# Patient Record
Sex: FEMALE | Race: BLACK OR AFRICAN AMERICAN | NOT HISPANIC OR LATINO | URBAN - METROPOLITAN AREA
[De-identification: names, ages, dates, MRNs, and addresses within clinical notes are randomized per-mention and may not be internally consistent; named-entity substitution may affect disease eponyms.]

---

## 2017-09-26 ENCOUNTER — INPATIENT (INPATIENT)
Facility: HOSPITAL | Age: 77
LOS: 2 days | Discharge: HOME | End: 2017-09-29
Attending: HOSPITALIST | Admitting: HOSPITALIST
Payer: MEDICARE

## 2017-09-26 VITALS
TEMPERATURE: 99 F | DIASTOLIC BLOOD PRESSURE: 89 MMHG | RESPIRATION RATE: 20 BRPM | HEART RATE: 120 BPM | SYSTOLIC BLOOD PRESSURE: 149 MMHG | OXYGEN SATURATION: 100 %

## 2017-09-26 DIAGNOSIS — I10 ESSENTIAL (PRIMARY) HYPERTENSION: ICD-10-CM

## 2017-09-26 DIAGNOSIS — R73.9 HYPERGLYCEMIA, UNSPECIFIED: ICD-10-CM

## 2017-09-26 DIAGNOSIS — Z29.9 ENCOUNTER FOR PROPHYLACTIC MEASURES, UNSPECIFIED: ICD-10-CM

## 2017-09-26 DIAGNOSIS — N17.9 ACUTE KIDNEY FAILURE, UNSPECIFIED: ICD-10-CM

## 2017-09-26 DIAGNOSIS — K59.00 CONSTIPATION, UNSPECIFIED: ICD-10-CM

## 2017-09-26 DIAGNOSIS — N39.0 URINARY TRACT INFECTION, SITE NOT SPECIFIED: ICD-10-CM

## 2017-09-26 DIAGNOSIS — E87.0 HYPEROSMOLALITY AND HYPERNATREMIA: ICD-10-CM

## 2017-09-26 DIAGNOSIS — E86.0 DEHYDRATION: ICD-10-CM

## 2017-09-26 LAB
ALBUMIN SERPL ELPH-MCNC: 3.5 G/DL — SIGNIFICANT CHANGE UP (ref 3.3–5)
ALBUMIN SERPL ELPH-MCNC: 4.4 G/DL — SIGNIFICANT CHANGE UP (ref 3.3–5)
ALP SERPL-CCNC: 114 U/L — SIGNIFICANT CHANGE UP (ref 40–120)
ALP SERPL-CCNC: 82 U/L — SIGNIFICANT CHANGE UP (ref 40–120)
ALT FLD-CCNC: 14 U/L — SIGNIFICANT CHANGE UP (ref 4–33)
ALT FLD-CCNC: 18 U/L — SIGNIFICANT CHANGE UP (ref 4–33)
APPEARANCE UR: CLEAR — SIGNIFICANT CHANGE UP
APTT BLD: 22.8 SEC — LOW (ref 27.5–37.4)
AST SERPL-CCNC: 13 U/L — SIGNIFICANT CHANGE UP (ref 4–32)
AST SERPL-CCNC: 14 U/L — SIGNIFICANT CHANGE UP (ref 4–32)
B-OH-BUTYR SERPL-SCNC: 6.8 MMOL/L — HIGH (ref 0–0.4)
BACTERIA # UR AUTO: SIGNIFICANT CHANGE UP
BASE EXCESS BLDV CALC-SCNC: -2.8 MMOL/L — SIGNIFICANT CHANGE UP
BASE EXCESS BLDV CALC-SCNC: -3.5 MMOL/L — SIGNIFICANT CHANGE UP
BASE EXCESS BLDV CALC-SCNC: 2.5 MMOL/L — SIGNIFICANT CHANGE UP
BASOPHILS # BLD AUTO: 0.01 K/UL — SIGNIFICANT CHANGE UP (ref 0–0.2)
BASOPHILS # BLD AUTO: 0.01 K/UL — SIGNIFICANT CHANGE UP (ref 0–0.2)
BASOPHILS # BLD AUTO: 0.03 K/UL — SIGNIFICANT CHANGE UP (ref 0–0.2)
BASOPHILS NFR BLD AUTO: 0.1 % — SIGNIFICANT CHANGE UP (ref 0–2)
BASOPHILS NFR BLD AUTO: 0.2 % — SIGNIFICANT CHANGE UP (ref 0–2)
BASOPHILS NFR BLD AUTO: 0.4 % — SIGNIFICANT CHANGE UP (ref 0–2)
BILIRUB SERPL-MCNC: 0.2 MG/DL — SIGNIFICANT CHANGE UP (ref 0.2–1.2)
BILIRUB SERPL-MCNC: 0.5 MG/DL — SIGNIFICANT CHANGE UP (ref 0.2–1.2)
BILIRUB UR-MCNC: NEGATIVE — SIGNIFICANT CHANGE UP
BLOOD GAS VENOUS - CREATININE: 1.19 MG/DL — SIGNIFICANT CHANGE UP (ref 0.5–1.3)
BLOOD GAS VENOUS - CREATININE: 1.37 MG/DL — HIGH (ref 0.5–1.3)
BLOOD GAS VENOUS - CREATININE: 1.64 MG/DL — HIGH (ref 0.5–1.3)
BLOOD UR QL VISUAL: NEGATIVE — SIGNIFICANT CHANGE UP
BUN SERPL-MCNC: 19 MG/DL — SIGNIFICANT CHANGE UP (ref 7–23)
BUN SERPL-MCNC: 22 MG/DL — SIGNIFICANT CHANGE UP (ref 7–23)
BUN SERPL-MCNC: 22 MG/DL — SIGNIFICANT CHANGE UP (ref 7–23)
BUN SERPL-MCNC: 27 MG/DL — HIGH (ref 7–23)
CALCIUM SERPL-MCNC: 11.2 MG/DL — HIGH (ref 8.4–10.5)
CALCIUM SERPL-MCNC: 8.6 MG/DL — SIGNIFICANT CHANGE UP (ref 8.4–10.5)
CALCIUM SERPL-MCNC: 9.3 MG/DL — SIGNIFICANT CHANGE UP (ref 8.4–10.5)
CALCIUM SERPL-MCNC: 9.6 MG/DL — SIGNIFICANT CHANGE UP (ref 8.4–10.5)
CHLORIDE BLDV-SCNC: 104 MMOL/L — SIGNIFICANT CHANGE UP (ref 96–108)
CHLORIDE BLDV-SCNC: 119 MMOL/L — HIGH (ref 96–108)
CHLORIDE BLDV-SCNC: > 120 MMOL/L — HIGH (ref 96–108)
CHLORIDE SERPL-SCNC: 112 MMOL/L — HIGH (ref 98–107)
CHLORIDE SERPL-SCNC: 116 MMOL/L — HIGH (ref 98–107)
CHLORIDE SERPL-SCNC: 121 MMOL/L — HIGH (ref 98–107)
CHLORIDE SERPL-SCNC: 95 MMOL/L — LOW (ref 98–107)
CK MB BLD-MCNC: 1 NG/ML — SIGNIFICANT CHANGE UP (ref 1–4.7)
CK MB BLD-MCNC: 1.42 NG/ML — SIGNIFICANT CHANGE UP (ref 1–4.7)
CK MB BLD-MCNC: 1.52 NG/ML — SIGNIFICANT CHANGE UP (ref 1–4.7)
CK MB BLD-MCNC: SIGNIFICANT CHANGE UP (ref 0–2.5)
CK SERPL-CCNC: 37 U/L — SIGNIFICANT CHANGE UP (ref 25–170)
CK SERPL-CCNC: 38 U/L — SIGNIFICANT CHANGE UP (ref 25–170)
CK SERPL-CCNC: 38 U/L — SIGNIFICANT CHANGE UP (ref 25–170)
CO2 SERPL-SCNC: 19 MMOL/L — LOW (ref 22–31)
CO2 SERPL-SCNC: 19 MMOL/L — LOW (ref 22–31)
CO2 SERPL-SCNC: 25 MMOL/L — SIGNIFICANT CHANGE UP (ref 22–31)
CO2 SERPL-SCNC: 25 MMOL/L — SIGNIFICANT CHANGE UP (ref 22–31)
COLOR SPEC: SIGNIFICANT CHANGE UP
CREAT SERPL-MCNC: 1.33 MG/DL — HIGH (ref 0.5–1.3)
CREAT SERPL-MCNC: 1.51 MG/DL — HIGH (ref 0.5–1.3)
CREAT SERPL-MCNC: 1.55 MG/DL — HIGH (ref 0.5–1.3)
CREAT SERPL-MCNC: 2.01 MG/DL — HIGH (ref 0.5–1.3)
EOSINOPHIL # BLD AUTO: 0 K/UL — SIGNIFICANT CHANGE UP (ref 0–0.5)
EOSINOPHIL NFR BLD AUTO: 0 % — SIGNIFICANT CHANGE UP (ref 0–6)
GAS PNL BLDV: 144 MMOL/L — SIGNIFICANT CHANGE UP (ref 136–146)
GAS PNL BLDV: 151 MMOL/L — HIGH (ref 136–146)
GAS PNL BLDV: 155 MMOL/L — HIGH (ref 136–146)
GLUCOSE BLDV-MCNC: 264 — HIGH (ref 70–99)
GLUCOSE BLDV-MCNC: 399 — HIGH (ref 70–99)
GLUCOSE BLDV-MCNC: 844 — CRITICAL HIGH (ref 70–99)
GLUCOSE SERPL-MCNC: 269 MG/DL — HIGH (ref 70–99)
GLUCOSE SERPL-MCNC: 305 MG/DL — HIGH (ref 70–99)
GLUCOSE SERPL-MCNC: 397 MG/DL — HIGH (ref 70–99)
GLUCOSE SERPL-MCNC: 914 MG/DL — CRITICAL HIGH (ref 70–99)
GLUCOSE UR-MCNC: >1000 — SIGNIFICANT CHANGE UP
HBA1C BLD-MCNC: 15.8 % — HIGH (ref 4–5.6)
HCO3 BLDV-SCNC: 21 MMOL/L — SIGNIFICANT CHANGE UP (ref 20–27)
HCO3 BLDV-SCNC: 21 MMOL/L — SIGNIFICANT CHANGE UP (ref 20–27)
HCO3 BLDV-SCNC: 24 MMOL/L — SIGNIFICANT CHANGE UP (ref 20–27)
HCT VFR BLD CALC: 40.8 % — SIGNIFICANT CHANGE UP (ref 34.5–45)
HCT VFR BLD CALC: 43 % — SIGNIFICANT CHANGE UP (ref 34.5–45)
HCT VFR BLD CALC: 49.4 % — HIGH (ref 34.5–45)
HCT VFR BLDV CALC: 43.1 % — SIGNIFICANT CHANGE UP (ref 34.5–45)
HCT VFR BLDV CALC: 49 % — HIGH (ref 34.5–45)
HCT VFR BLDV CALC: 57.8 % — CRITICAL HIGH (ref 34.5–45)
HGB BLD-MCNC: 13.1 G/DL — SIGNIFICANT CHANGE UP (ref 11.5–15.5)
HGB BLD-MCNC: 13.7 G/DL — SIGNIFICANT CHANGE UP (ref 11.5–15.5)
HGB BLD-MCNC: 15.8 G/DL — HIGH (ref 11.5–15.5)
HGB BLDV-MCNC: 14 G/DL — SIGNIFICANT CHANGE UP (ref 11.5–15.5)
HGB BLDV-MCNC: 16 G/DL — HIGH (ref 11.5–15.5)
HGB BLDV-MCNC: 18.9 G/DL — HIGH (ref 11.5–15.5)
IMM GRANULOCYTES # BLD AUTO: 0.02 # — SIGNIFICANT CHANGE UP
IMM GRANULOCYTES # BLD AUTO: 0.03 # — SIGNIFICANT CHANGE UP
IMM GRANULOCYTES # BLD AUTO: 0.03 # — SIGNIFICANT CHANGE UP
IMM GRANULOCYTES NFR BLD AUTO: 0.3 % — SIGNIFICANT CHANGE UP (ref 0–1.5)
IMM GRANULOCYTES NFR BLD AUTO: 0.4 % — SIGNIFICANT CHANGE UP (ref 0–1.5)
IMM GRANULOCYTES NFR BLD AUTO: 0.5 % — SIGNIFICANT CHANGE UP (ref 0–1.5)
INR BLD: 0.96 — SIGNIFICANT CHANGE UP (ref 0.88–1.17)
KETONES UR-MCNC: SIGNIFICANT CHANGE UP
LACTATE BLDV-MCNC: 2.3 MMOL/L — HIGH (ref 0.5–2)
LACTATE BLDV-MCNC: 2.7 MMOL/L — HIGH (ref 0.5–2)
LACTATE BLDV-MCNC: 3.1 MMOL/L — HIGH (ref 0.5–2)
LACTATE SERPL-SCNC: 2.8 MMOL/L — HIGH (ref 0.5–2)
LEUKOCYTE ESTERASE UR-ACNC: HIGH
LYMPHOCYTES # BLD AUTO: 0.58 K/UL — LOW (ref 1–3.3)
LYMPHOCYTES # BLD AUTO: 1.01 K/UL — SIGNIFICANT CHANGE UP (ref 1–3.3)
LYMPHOCYTES # BLD AUTO: 1.1 K/UL — SIGNIFICANT CHANGE UP (ref 1–3.3)
LYMPHOCYTES # BLD AUTO: 13.8 % — SIGNIFICANT CHANGE UP (ref 13–44)
LYMPHOCYTES # BLD AUTO: 16.2 % — SIGNIFICANT CHANGE UP (ref 13–44)
LYMPHOCYTES # BLD AUTO: 9.2 % — LOW (ref 13–44)
MAGNESIUM SERPL-MCNC: 2.8 MG/DL — HIGH (ref 1.6–2.6)
MCHC RBC-ENTMCNC: 27.9 PG — SIGNIFICANT CHANGE UP (ref 27–34)
MCHC RBC-ENTMCNC: 28.2 PG — SIGNIFICANT CHANGE UP (ref 27–34)
MCHC RBC-ENTMCNC: 28.3 PG — SIGNIFICANT CHANGE UP (ref 27–34)
MCHC RBC-ENTMCNC: 31.9 % — LOW (ref 32–36)
MCHC RBC-ENTMCNC: 32 % — SIGNIFICANT CHANGE UP (ref 32–36)
MCHC RBC-ENTMCNC: 32.1 % — SIGNIFICANT CHANGE UP (ref 32–36)
MCV RBC AUTO: 86.8 FL — SIGNIFICANT CHANGE UP (ref 80–100)
MCV RBC AUTO: 88.5 FL — SIGNIFICANT CHANGE UP (ref 80–100)
MCV RBC AUTO: 88.7 FL — SIGNIFICANT CHANGE UP (ref 80–100)
MONOCYTES # BLD AUTO: 0.42 K/UL — SIGNIFICANT CHANGE UP (ref 0–0.9)
MONOCYTES # BLD AUTO: 0.55 K/UL — SIGNIFICANT CHANGE UP (ref 0–0.9)
MONOCYTES # BLD AUTO: 0.62 K/UL — SIGNIFICANT CHANGE UP (ref 0–0.9)
MONOCYTES NFR BLD AUTO: 6.6 % — SIGNIFICANT CHANGE UP (ref 2–14)
MONOCYTES NFR BLD AUTO: 7.5 % — SIGNIFICANT CHANGE UP (ref 2–14)
MONOCYTES NFR BLD AUTO: 9.1 % — SIGNIFICANT CHANGE UP (ref 2–14)
NEUTROPHILS # BLD AUTO: 5.04 K/UL — SIGNIFICANT CHANGE UP (ref 1.8–7.4)
NEUTROPHILS # BLD AUTO: 5.29 K/UL — SIGNIFICANT CHANGE UP (ref 1.8–7.4)
NEUTROPHILS # BLD AUTO: 5.68 K/UL — SIGNIFICANT CHANGE UP (ref 1.8–7.4)
NEUTROPHILS NFR BLD AUTO: 74.3 % — SIGNIFICANT CHANGE UP (ref 43–77)
NEUTROPHILS NFR BLD AUTO: 77.9 % — HIGH (ref 43–77)
NEUTROPHILS NFR BLD AUTO: 83.5 % — HIGH (ref 43–77)
NITRITE UR-MCNC: NEGATIVE — SIGNIFICANT CHANGE UP
NON-SQ EPI CELLS # UR AUTO: <1 — SIGNIFICANT CHANGE UP
NRBC # FLD: 0 — SIGNIFICANT CHANGE UP
OSMOLALITY SERPL: 319 MOSMO/KG — HIGH (ref 275–295)
PCO2 BLDV: 41 MMHG — SIGNIFICANT CHANGE UP (ref 41–51)
PCO2 BLDV: 45 MMHG — SIGNIFICANT CHANGE UP (ref 41–51)
PCO2 BLDV: 56 MMHG — HIGH (ref 41–51)
PH BLDV: 7.32 PH — SIGNIFICANT CHANGE UP (ref 7.32–7.43)
PH BLDV: 7.32 PH — SIGNIFICANT CHANGE UP (ref 7.32–7.43)
PH BLDV: 7.34 PH — SIGNIFICANT CHANGE UP (ref 7.32–7.43)
PH UR: 6 — SIGNIFICANT CHANGE UP (ref 4.6–8)
PHOSPHATE SERPL-MCNC: 5.2 MG/DL — HIGH (ref 2.5–4.5)
PLATELET # BLD AUTO: 209 K/UL — SIGNIFICANT CHANGE UP (ref 150–400)
PLATELET # BLD AUTO: 215 K/UL — SIGNIFICANT CHANGE UP (ref 150–400)
PLATELET # BLD AUTO: 243 K/UL — SIGNIFICANT CHANGE UP (ref 150–400)
PMV BLD: 11.5 FL — SIGNIFICANT CHANGE UP (ref 7–13)
PMV BLD: 11.7 FL — SIGNIFICANT CHANGE UP (ref 7–13)
PMV BLD: 12.4 FL — SIGNIFICANT CHANGE UP (ref 7–13)
PO2 BLDV: 28 MMHG — LOW (ref 35–40)
PO2 BLDV: 49 MMHG — HIGH (ref 35–40)
PO2 BLDV: 59 MMHG — HIGH (ref 35–40)
POTASSIUM BLDV-SCNC: 3.7 MMOL/L — SIGNIFICANT CHANGE UP (ref 3.4–4.5)
POTASSIUM BLDV-SCNC: 4.1 MMOL/L — SIGNIFICANT CHANGE UP (ref 3.4–4.5)
POTASSIUM BLDV-SCNC: 5.5 MMOL/L — HIGH (ref 3.4–4.5)
POTASSIUM SERPL-MCNC: 4.1 MMOL/L — SIGNIFICANT CHANGE UP (ref 3.5–5.3)
POTASSIUM SERPL-MCNC: 4.3 MMOL/L — SIGNIFICANT CHANGE UP (ref 3.5–5.3)
POTASSIUM SERPL-MCNC: 4.4 MMOL/L — SIGNIFICANT CHANGE UP (ref 3.5–5.3)
POTASSIUM SERPL-MCNC: 5.7 MMOL/L — HIGH (ref 3.5–5.3)
POTASSIUM SERPL-SCNC: 4.1 MMOL/L — SIGNIFICANT CHANGE UP (ref 3.5–5.3)
POTASSIUM SERPL-SCNC: 4.3 MMOL/L — SIGNIFICANT CHANGE UP (ref 3.5–5.3)
POTASSIUM SERPL-SCNC: 4.4 MMOL/L — SIGNIFICANT CHANGE UP (ref 3.5–5.3)
POTASSIUM SERPL-SCNC: 5.7 MMOL/L — HIGH (ref 3.5–5.3)
PROT SERPL-MCNC: 6.7 G/DL — SIGNIFICANT CHANGE UP (ref 6–8.3)
PROT SERPL-MCNC: 8.6 G/DL — HIGH (ref 6–8.3)
PROT UR-MCNC: NEGATIVE — SIGNIFICANT CHANGE UP
PROTHROM AB SERPL-ACNC: 10.7 SEC — SIGNIFICANT CHANGE UP (ref 9.8–13.1)
RBC # BLD: 4.7 M/UL — SIGNIFICANT CHANGE UP (ref 3.8–5.2)
RBC # BLD: 4.85 M/UL — SIGNIFICANT CHANGE UP (ref 3.8–5.2)
RBC # BLD: 5.58 M/UL — HIGH (ref 3.8–5.2)
RBC # FLD: 13.3 % — SIGNIFICANT CHANGE UP (ref 10.3–14.5)
RBC # FLD: 13.4 % — SIGNIFICANT CHANGE UP (ref 10.3–14.5)
RBC # FLD: 13.5 % — SIGNIFICANT CHANGE UP (ref 10.3–14.5)
RBC CASTS # UR COMP ASSIST: SIGNIFICANT CHANGE UP (ref 0–?)
SAO2 % BLDV: 45.4 % — LOW (ref 60–85)
SAO2 % BLDV: 79.8 % — SIGNIFICANT CHANGE UP (ref 60–85)
SAO2 % BLDV: 88.5 % — HIGH (ref 60–85)
SODIUM SERPL-SCNC: 145 MMOL/L — SIGNIFICANT CHANGE UP (ref 135–145)
SODIUM SERPL-SCNC: 149 MMOL/L — HIGH (ref 135–145)
SODIUM SERPL-SCNC: 155 MMOL/L — HIGH (ref 135–145)
SODIUM SERPL-SCNC: 158 MMOL/L — HIGH (ref 135–145)
SP GR SPEC: 1.03 — HIGH (ref 1–1.03)
SQUAMOUS # UR AUTO: SIGNIFICANT CHANGE UP
TROPONIN T SERPL-MCNC: < 0.06 NG/ML — SIGNIFICANT CHANGE UP (ref 0–0.06)
UROBILINOGEN FLD QL: NORMAL E.U. — SIGNIFICANT CHANGE UP (ref 0.1–0.2)
WBC # BLD: 6.33 K/UL — SIGNIFICANT CHANGE UP (ref 3.8–10.5)
WBC # BLD: 6.79 K/UL — SIGNIFICANT CHANGE UP (ref 3.8–10.5)
WBC # BLD: 7.3 K/UL — SIGNIFICANT CHANGE UP (ref 3.8–10.5)
WBC # FLD AUTO: 6.33 K/UL — SIGNIFICANT CHANGE UP (ref 3.8–10.5)
WBC # FLD AUTO: 6.79 K/UL — SIGNIFICANT CHANGE UP (ref 3.8–10.5)
WBC # FLD AUTO: 7.3 K/UL — SIGNIFICANT CHANGE UP (ref 3.8–10.5)
WBC UR QL: >50 — HIGH (ref 0–?)
YEAST BUDDING # UR COMP ASSIST: SIGNIFICANT CHANGE UP

## 2017-09-26 PROCEDURE — 71010: CPT | Mod: 26

## 2017-09-26 PROCEDURE — 70450 CT HEAD/BRAIN W/O DYE: CPT | Mod: 26

## 2017-09-26 PROCEDURE — 74000: CPT | Mod: 26

## 2017-09-26 RX ORDER — POLYETHYLENE GLYCOL 3350 17 G/17G
17 POWDER, FOR SOLUTION ORAL
Qty: 0 | Refills: 0 | Status: DISCONTINUED | OUTPATIENT
Start: 2017-09-26 | End: 2017-09-29

## 2017-09-26 RX ORDER — SODIUM CHLORIDE 9 MG/ML
2500 INJECTION INTRAMUSCULAR; INTRAVENOUS; SUBCUTANEOUS ONCE
Qty: 0 | Refills: 0 | Status: COMPLETED | OUTPATIENT
Start: 2017-09-26 | End: 2017-09-26

## 2017-09-26 RX ORDER — DEXTROSE 50 % IN WATER 50 %
25 SYRINGE (ML) INTRAVENOUS ONCE
Qty: 0 | Refills: 0 | Status: DISCONTINUED | OUTPATIENT
Start: 2017-09-26 | End: 2017-09-29

## 2017-09-26 RX ORDER — INSULIN HUMAN 100 [IU]/ML
7 INJECTION, SOLUTION SUBCUTANEOUS ONCE
Qty: 0 | Refills: 0 | Status: COMPLETED | OUTPATIENT
Start: 2017-09-26 | End: 2017-09-26

## 2017-09-26 RX ORDER — DOCUSATE SODIUM 100 MG
100 CAPSULE ORAL THREE TIMES A DAY
Qty: 0 | Refills: 0 | Status: DISCONTINUED | OUTPATIENT
Start: 2017-09-26 | End: 2017-09-29

## 2017-09-26 RX ORDER — CEFTRIAXONE 500 MG/1
1 INJECTION, POWDER, FOR SOLUTION INTRAMUSCULAR; INTRAVENOUS EVERY 24 HOURS
Qty: 0 | Refills: 0 | Status: DISCONTINUED | OUTPATIENT
Start: 2017-09-26 | End: 2017-09-28

## 2017-09-26 RX ORDER — INSULIN HUMAN 100 [IU]/ML
6 INJECTION, SOLUTION SUBCUTANEOUS ONCE
Qty: 0 | Refills: 0 | Status: COMPLETED | OUTPATIENT
Start: 2017-09-26 | End: 2017-09-26

## 2017-09-26 RX ORDER — ONDANSETRON 8 MG/1
4 TABLET, FILM COATED ORAL EVERY 6 HOURS
Qty: 0 | Refills: 0 | Status: DISCONTINUED | OUTPATIENT
Start: 2017-09-26 | End: 2017-09-29

## 2017-09-26 RX ORDER — SODIUM CHLORIDE 9 MG/ML
1000 INJECTION INTRAMUSCULAR; INTRAVENOUS; SUBCUTANEOUS ONCE
Qty: 0 | Refills: 0 | Status: COMPLETED | OUTPATIENT
Start: 2017-09-26 | End: 2017-09-26

## 2017-09-26 RX ORDER — SODIUM CHLORIDE 9 MG/ML
1000 INJECTION, SOLUTION INTRAVENOUS
Qty: 0 | Refills: 0 | Status: DISCONTINUED | OUTPATIENT
Start: 2017-09-26 | End: 2017-09-29

## 2017-09-26 RX ORDER — DEXTROSE 50 % IN WATER 50 %
12.5 SYRINGE (ML) INTRAVENOUS ONCE
Qty: 0 | Refills: 0 | Status: DISCONTINUED | OUTPATIENT
Start: 2017-09-26 | End: 2017-09-29

## 2017-09-26 RX ORDER — INSULIN GLARGINE 100 [IU]/ML
16 INJECTION, SOLUTION SUBCUTANEOUS ONCE
Qty: 0 | Refills: 0 | Status: COMPLETED | OUTPATIENT
Start: 2017-09-26 | End: 2017-09-26

## 2017-09-26 RX ORDER — ACETAMINOPHEN 500 MG
650 TABLET ORAL ONCE
Qty: 0 | Refills: 0 | Status: COMPLETED | OUTPATIENT
Start: 2017-09-26 | End: 2017-09-26

## 2017-09-26 RX ORDER — GLUCAGON INJECTION, SOLUTION 0.5 MG/.1ML
1 INJECTION, SOLUTION SUBCUTANEOUS ONCE
Qty: 0 | Refills: 0 | Status: DISCONTINUED | OUTPATIENT
Start: 2017-09-26 | End: 2017-09-29

## 2017-09-26 RX ORDER — HEPARIN SODIUM 5000 [USP'U]/ML
5000 INJECTION INTRAVENOUS; SUBCUTANEOUS EVERY 8 HOURS
Qty: 0 | Refills: 0 | Status: DISCONTINUED | OUTPATIENT
Start: 2017-09-26 | End: 2017-09-29

## 2017-09-26 RX ORDER — INSULIN LISPRO 100/ML
VIAL (ML) SUBCUTANEOUS
Qty: 0 | Refills: 0 | Status: DISCONTINUED | OUTPATIENT
Start: 2017-09-26 | End: 2017-09-29

## 2017-09-26 RX ORDER — CEFTRIAXONE 500 MG/1
1 INJECTION, POWDER, FOR SOLUTION INTRAMUSCULAR; INTRAVENOUS ONCE
Qty: 0 | Refills: 0 | Status: COMPLETED | OUTPATIENT
Start: 2017-09-26 | End: 2017-09-26

## 2017-09-26 RX ORDER — SODIUM CHLORIDE 9 MG/ML
1000 INJECTION, SOLUTION INTRAVENOUS
Qty: 0 | Refills: 0 | Status: COMPLETED | OUTPATIENT
Start: 2017-09-26 | End: 2017-09-26

## 2017-09-26 RX ORDER — INSULIN LISPRO 100/ML
4 VIAL (ML) SUBCUTANEOUS ONCE
Qty: 0 | Refills: 0 | Status: COMPLETED | OUTPATIENT
Start: 2017-09-26 | End: 2017-09-26

## 2017-09-26 RX ORDER — SENNA PLUS 8.6 MG/1
2 TABLET ORAL AT BEDTIME
Qty: 0 | Refills: 0 | Status: DISCONTINUED | OUTPATIENT
Start: 2017-09-26 | End: 2017-09-29

## 2017-09-26 RX ORDER — INSULIN LISPRO 100/ML
VIAL (ML) SUBCUTANEOUS AT BEDTIME
Qty: 0 | Refills: 0 | Status: DISCONTINUED | OUTPATIENT
Start: 2017-09-26 | End: 2017-09-29

## 2017-09-26 RX ORDER — DEXTROSE 50 % IN WATER 50 %
1 SYRINGE (ML) INTRAVENOUS ONCE
Qty: 0 | Refills: 0 | Status: DISCONTINUED | OUTPATIENT
Start: 2017-09-26 | End: 2017-09-29

## 2017-09-26 RX ORDER — INSULIN HUMAN 100 [IU]/ML
4 INJECTION, SOLUTION SUBCUTANEOUS
Qty: 100 | Refills: 0 | Status: DISCONTINUED | OUTPATIENT
Start: 2017-09-26 | End: 2017-09-26

## 2017-09-26 RX ADMIN — CEFTRIAXONE 100 GRAM(S): 500 INJECTION, POWDER, FOR SOLUTION INTRAMUSCULAR; INTRAVENOUS at 10:02

## 2017-09-26 RX ADMIN — INSULIN GLARGINE 16 UNIT(S): 100 INJECTION, SOLUTION SUBCUTANEOUS at 18:42

## 2017-09-26 RX ADMIN — Medication 100 MILLIGRAM(S): at 21:53

## 2017-09-26 RX ADMIN — INSULIN HUMAN 7 UNIT(S)/HR: 100 INJECTION, SOLUTION SUBCUTANEOUS at 11:15

## 2017-09-26 RX ADMIN — INSULIN HUMAN 7 UNIT(S): 100 INJECTION, SOLUTION SUBCUTANEOUS at 11:08

## 2017-09-26 RX ADMIN — POLYETHYLENE GLYCOL 3350 17 GRAM(S): 17 POWDER, FOR SOLUTION ORAL at 21:50

## 2017-09-26 RX ADMIN — SODIUM CHLORIDE 4000 MILLILITER(S): 9 INJECTION INTRAMUSCULAR; INTRAVENOUS; SUBCUTANEOUS at 12:06

## 2017-09-26 RX ADMIN — SODIUM CHLORIDE 250 MILLILITER(S): 9 INJECTION, SOLUTION INTRAVENOUS at 16:25

## 2017-09-26 RX ADMIN — INSULIN HUMAN 6 UNIT(S): 100 INJECTION, SOLUTION SUBCUTANEOUS at 10:25

## 2017-09-26 RX ADMIN — INSULIN HUMAN 4 UNIT(S)/HR: 100 INJECTION, SOLUTION SUBCUTANEOUS at 14:40

## 2017-09-26 RX ADMIN — SODIUM CHLORIDE 75 MILLILITER(S): 9 INJECTION, SOLUTION INTRAVENOUS at 19:54

## 2017-09-26 RX ADMIN — SODIUM CHLORIDE 2500 MILLILITER(S): 9 INJECTION INTRAMUSCULAR; INTRAVENOUS; SUBCUTANEOUS at 10:02

## 2017-09-26 RX ADMIN — SENNA PLUS 2 TABLET(S): 8.6 TABLET ORAL at 21:50

## 2017-09-26 RX ADMIN — Medication 1: at 22:02

## 2017-09-26 RX ADMIN — Medication 4 UNIT(S): at 20:21

## 2017-09-26 RX ADMIN — HEPARIN SODIUM 5000 UNIT(S): 5000 INJECTION INTRAVENOUS; SUBCUTANEOUS at 21:53

## 2017-09-26 RX ADMIN — Medication 650 MILLIGRAM(S): at 10:02

## 2017-09-26 NOTE — ED PROVIDER NOTE - OBJECTIVE STATEMENT
77F presents to the ED with decreased PO, and lower abdominal pain, but no dysuria, no hematuria, +urinary frequency.  No fever, no chills, +nausea, no vomiting.  Patient's   one month ago and she has had a progressive decline in her functional status.  No mental status change.  No headache, no falls, no head trauma. 77F presents to the ED with decreased PO, and lower abdominal pain, but no dysuria, no hematuria, +urinary frequency.  No fever, no chills, +nausea, no vomiting.  Patient's   one month ago and she has had a progressive decline in her functional status.  No mental status change.  No headache, no falls, no head trauma.    PA Baseil: Agree with above. 77 year old female PMHx of DM on metformin; presents to the ED with worsening generalized weakness and decreased PO. As per family, the patient's   one month ago- since then she has had progressively decreasing PO. For the last two weeks she has c/o generalized lower abdominal pain, accompanied by nausea. She was seen by her PMD who gave her a stool softener and recommended she see a GI doctor. When the family arrived today the patient was weaker (generalized) than the previous day prompting them to come to the ED. She endorses urinary frequency. No dysuria, fevers, chills, vomiting, diarrhea, cough, chest pain or other complaints.

## 2017-09-26 NOTE — H&P ADULT - PROBLEM SELECTOR PLAN 2
-Likely secondary to decreased PO and hyperosmolar state.  -C/w IVF. 1/2NS due to hypernatremia Likely 2/2 dehydration from poor PO intake and elevated glucose. Free water deficit over 2.2L  -Will start 1/2 NS @ 75 cc hour  -Monitor I/Os   -Recheck BMP @10pm

## 2017-09-26 NOTE — CONSULT NOTE ADULT - ASSESSMENT
77 year old female PMHx of DM on metformin; presents to the ED with worsening generalized weakness and decreased PO found to be in hyperosmolar state with ketosis but without acidosis.     Hyperosmolar states 2/2 to hyperglycemia.   - AG now closed, c/w with hydration using 1/2 NS without D5. would lower Insulin gtt to 2 units and d/c in 2 hours. Check 1hour FS until that time.   - Give stat weight based Lantus now, premeals if taking feeds. Endo consult. Check a1c.   - Treat underlying UTI.   - No longer a candidate for MICU    Dae Hyeon Kim [id number]  Department of Internal Medicine  Pager [phone]

## 2017-09-26 NOTE — ED PROVIDER NOTE - MEDICAL DECISION MAKING DETAILS
Sepsis - undifferentiated - reports abdominal pain, but non tender on examination; +constipation  1) IV Access/IVF/LABS/UA/Cultures  2) CXR 3) IV Abx 4) Admit

## 2017-09-26 NOTE — ED ADULT NURSE NOTE - OBJECTIVE STATEMENT
Pt received to TRa.  Lethargic.  eyes open. oriented x 3.  c/o worsening generalized weakness and decreased po intake x 1 week.  reports generalized abd pain w nausea x 1 month.  tongue thrush noted.  dry area to rt side of mouth present.  pt c/o pain to area.  FS "hi" in triage. MD wilcox at bedside.  SL placed. Labs sent.  NS bolus in progress as ordered. VS as charted.  rectal temp 100.3.  rectal tyl given as ordered. antibiotics initiated as ordered.  voided x 1 for urine specimens.  pts skin intact.  Lab resulted glucose as 844.  MD aware.  Insulin given as ordered.  Q1 hr FS initiated. continuous monitoring in progress.  pt resting.  family at bedside.

## 2017-09-26 NOTE — H&P ADULT - PROBLEM SELECTOR PLAN 1
Labs revealed elevated anion gap, elevated ketones (beta-hydroxybutyrate), but normal pH values. Due to the absence of acidosis, pt likely in hyperosmolar nonketotic state.  -C/w IVF, HISS Labs revealed elevated anion gap, elevated ketones (beta-hydroxybutyrate), but normal pH values. Due to the absence of acidosis, pt not in DKA and likely in hyperosmolar state.  -C/w IVF, HISS Labs revealed elevated anion gap, elevated ketones (beta-hydroxybutyrate), but normal pH values. Due to the absence of acidosis, pt not in DKA and likely in hyperosmolar state. s/p Lantus 16  -Recheck BMP @10pm to monitor AG  -FS q4  -Sliding scale   -Endocrine consult in the AM  -IF AG worsens will

## 2017-09-26 NOTE — H&P ADULT - NSHPSOCIALHISTORY_GEN_ALL_CORE
Lives with son and daughter since  passed away. Never smoked, doesn't drink EtOH, or use recreational drugs.

## 2017-09-26 NOTE — ED ADULT NURSE REASSESSMENT NOTE - NS ED NURSE REASSESS COMMENT FT1
dinner coverage RN:  6pm .  MD aware.  Insulin drip d/c's as ordered.  NS bolus d/c'd as ordered as well.  Repeat BMP sent as ordered.  Will administer Lantus insulin as ordered when avail from pharmacy.  pt served dinner.  admitted.  bed assigned.  will call report.  VSS.  pt resting in nad

## 2017-09-26 NOTE — CONSULT NOTE ADULT - ATTENDING COMMENTS
patient with dm with weakness and decreased po intake presents with  hyperglycemia and anion gap metabolic acidosis s/p iv bolus ns with  closing of gap.   Suggest changing the fluid to 1.2 ns at 100cc/hr and  giving lantus and d/cing  insuling drip in 2 hrs. patient does not require  icu level care.

## 2017-09-26 NOTE — ED PROVIDER NOTE - PHYSICAL EXAMINATION
ATTENDING PHYSICAL EXAM DR. KRAFT ***GEN - NAD; well appearing; A+O x3 ***HEAD - NC/AT ***EYES/NOSE - PERRL, EOMI, mucous membranes dry, no discharge ***THROAT: Oral cavity with thrush mild but pharynx normal. No inflammation, swelling, exudate, or lesions.  ***NECK: Neck supple, non-tender without lymphadenopathy, no masses, no thyromegaly.   ***PULMONARY - CTA b/l, symmetric breath sounds. ***CARDIAC -s1s2, RRR, no M,G,R  ***ABDOMEN - +BS, ND, NT, soft, no guarding, no rebound, no masses   ***BACK - no CVA tenderness, Normal  spine ***EXTREMITIES - symmetric pulses, 2+ dp, capillary refill < 2 seconds, no clubbing, no cyanosis, no edema ***SKIN - no rash or bruising   ***NEUROLOGIC - alert

## 2017-09-26 NOTE — ED PROVIDER NOTE - CARE PLAN
Principal Discharge DX:	Sepsis Principal Discharge DX:	Severe sepsis Principal Discharge DX:	Dehydration  Secondary Diagnosis:	Hyperglycemia

## 2017-09-26 NOTE — ED PROVIDER NOTE - MOUTH NORMAL
I personally examined the patient and provided care in conjunction with the resident. abnormal/expand...

## 2017-09-26 NOTE — H&P ADULT - ATTENDING COMMENTS
Pt seen and examined by me on 9/27.  Agree with resident  1) Uncontrolled DM with resolving HHNK with ketosis s/p Insulin gtt. Gap now closed. On Basal/bolus regimen. Will uptitrate as per BS  Pt will need diabetic teaching/Insulin teaching  Endo consult called- REAL Endo NP  2) Likely Acid reflux with intermittent dysphagia x 6 months- Will check Barium swallow  3) DVT prox- HSQ .

## 2017-09-26 NOTE — ED ADULT NURSE NOTE - BED ASSIGNMENT RECEIVED
Pt needs appt.   
Spoke with patient regarding refill request. She has appt coming up with Zoila Chapman for CPE. Script refilled per protocol.   
18:51

## 2017-09-26 NOTE — ED PROVIDER NOTE - CRITICAL CARE PROVIDED
direct patient care (not related to procedure)/additional history taking/documentation/interpretation of diagnostic studies/consult w/ pt's family directly relating to pts condition/consultation with other physicians

## 2017-09-26 NOTE — H&P ADULT - PROBLEM SELECTOR PLAN 3
Patient called back for her lab results    -Ceftriaxone for broad-spectrum antibiotics Nelia prerenal in setting of dehydration. Unclear patients baseline but improving  -Will obtain outpatient labs to detemrine what is pts baseline renal function  -Hold nephrotoxins

## 2017-09-26 NOTE — ED PROVIDER NOTE - CHPI ED SYMPTOMS NEG
no diarrhea/no blood in stool/no abdominal distension/no dysuria/no burning urination/no chills/no vomiting

## 2017-09-26 NOTE — H&P ADULT - ASSESSMENT
78 yo F w PMHx of HTN, DM on metformin, p/w nausea, vomiting, lethargy, and lower abdominal pain. Labs revealed elevated anion gap, elevated ketones (beta-hydroxybutyrate), but normal pH values. General chemistry revealed hyperkalemia, hyperglycemia, hypercalcemia, and high protein. CBC revealed elevated hg/hct. These values have begun to normalize with fluids and insulin in the ED, though sodium has become elevated. Due to the absence of acidosis, pt likely suffering from severe dehydration and hyperosmolar states without altered mental status.

## 2017-09-26 NOTE — ED ADULT TRIAGE NOTE - CHIEF COMPLAINT QUOTE
Pt with a c/o nausea/vomiting and Fever x 1 week. Pt F/s in triage HI. pt seems lethargic during triage.   pmhx DM

## 2017-09-27 DIAGNOSIS — E11.65 TYPE 2 DIABETES MELLITUS WITH HYPERGLYCEMIA: ICD-10-CM

## 2017-09-27 DIAGNOSIS — R13.10 DYSPHAGIA, UNSPECIFIED: ICD-10-CM

## 2017-09-27 DIAGNOSIS — N30.00 ACUTE CYSTITIS WITHOUT HEMATURIA: ICD-10-CM

## 2017-09-27 DIAGNOSIS — E88.89 OTHER SPECIFIED METABOLIC DISORDERS: ICD-10-CM

## 2017-09-27 LAB
BACTERIA UR CULT: SIGNIFICANT CHANGE UP
BASOPHILS # BLD AUTO: 0.04 K/UL — SIGNIFICANT CHANGE UP (ref 0–0.2)
BASOPHILS NFR BLD AUTO: 0.6 % — SIGNIFICANT CHANGE UP (ref 0–2)
BUN SERPL-MCNC: 15 MG/DL — SIGNIFICANT CHANGE UP (ref 7–23)
CALCIUM SERPL-MCNC: 8.6 MG/DL — SIGNIFICANT CHANGE UP (ref 8.4–10.5)
CHLORIDE SERPL-SCNC: 109 MMOL/L — HIGH (ref 98–107)
CO2 SERPL-SCNC: 24 MMOL/L — SIGNIFICANT CHANGE UP (ref 22–31)
CREAT SERPL-MCNC: 1.17 MG/DL — SIGNIFICANT CHANGE UP (ref 0.5–1.3)
EOSINOPHIL # BLD AUTO: 0.07 K/UL — SIGNIFICANT CHANGE UP (ref 0–0.5)
EOSINOPHIL NFR BLD AUTO: 1.1 % — SIGNIFICANT CHANGE UP (ref 0–6)
GLUCOSE SERPL-MCNC: 264 MG/DL — HIGH (ref 70–99)
HCT VFR BLD CALC: 39 % — SIGNIFICANT CHANGE UP (ref 34.5–45)
HGB BLD-MCNC: 12.3 G/DL — SIGNIFICANT CHANGE UP (ref 11.5–15.5)
IMM GRANULOCYTES # BLD AUTO: 0.01 # — SIGNIFICANT CHANGE UP
IMM GRANULOCYTES NFR BLD AUTO: 0.2 % — SIGNIFICANT CHANGE UP (ref 0–1.5)
LDH SERPL L TO P-CCNC: 149 U/L — SIGNIFICANT CHANGE UP (ref 135–225)
LYMPHOCYTES # BLD AUTO: 1.63 K/UL — SIGNIFICANT CHANGE UP (ref 1–3.3)
LYMPHOCYTES # BLD AUTO: 26.5 % — SIGNIFICANT CHANGE UP (ref 13–44)
MAGNESIUM SERPL-MCNC: 1.9 MG/DL — SIGNIFICANT CHANGE UP (ref 1.6–2.6)
MCHC RBC-ENTMCNC: 28 PG — SIGNIFICANT CHANGE UP (ref 27–34)
MCHC RBC-ENTMCNC: 31.5 % — LOW (ref 32–36)
MCV RBC AUTO: 88.6 FL — SIGNIFICANT CHANGE UP (ref 80–100)
MONOCYTES # BLD AUTO: 0.67 K/UL — SIGNIFICANT CHANGE UP (ref 0–0.9)
MONOCYTES NFR BLD AUTO: 10.9 % — SIGNIFICANT CHANGE UP (ref 2–14)
NEUTROPHILS # BLD AUTO: 3.74 K/UL — SIGNIFICANT CHANGE UP (ref 1.8–7.4)
NEUTROPHILS NFR BLD AUTO: 60.7 % — SIGNIFICANT CHANGE UP (ref 43–77)
NRBC # FLD: 0 — SIGNIFICANT CHANGE UP
PHOSPHATE SERPL-MCNC: 1.8 MG/DL — LOW (ref 2.5–4.5)
PLATELET # BLD AUTO: 188 K/UL — SIGNIFICANT CHANGE UP (ref 150–400)
PMV BLD: 11.7 FL — SIGNIFICANT CHANGE UP (ref 7–13)
POTASSIUM SERPL-MCNC: 4.4 MMOL/L — SIGNIFICANT CHANGE UP (ref 3.5–5.3)
POTASSIUM SERPL-SCNC: 4.4 MMOL/L — SIGNIFICANT CHANGE UP (ref 3.5–5.3)
RBC # BLD: 4.4 M/UL — SIGNIFICANT CHANGE UP (ref 3.8–5.2)
RBC # FLD: 13.5 % — SIGNIFICANT CHANGE UP (ref 10.3–14.5)
SODIUM SERPL-SCNC: 144 MMOL/L — SIGNIFICANT CHANGE UP (ref 135–145)
SPECIMEN SOURCE: SIGNIFICANT CHANGE UP
WBC # BLD: 6.16 K/UL — SIGNIFICANT CHANGE UP (ref 3.8–10.5)
WBC # FLD AUTO: 6.16 K/UL — SIGNIFICANT CHANGE UP (ref 3.8–10.5)

## 2017-09-27 PROCEDURE — 99223 1ST HOSP IP/OBS HIGH 75: CPT

## 2017-09-27 PROCEDURE — 99223 1ST HOSP IP/OBS HIGH 75: CPT | Mod: AI,GC

## 2017-09-27 PROCEDURE — 74220 X-RAY XM ESOPHAGUS 1CNTRST: CPT | Mod: 26

## 2017-09-27 RX ORDER — INSULIN GLARGINE 100 [IU]/ML
15 INJECTION, SOLUTION SUBCUTANEOUS
Qty: 0 | Refills: 0 | Status: DISCONTINUED | OUTPATIENT
Start: 2017-09-27 | End: 2017-09-29

## 2017-09-27 RX ORDER — INSULIN GLARGINE 100 [IU]/ML
15 INJECTION, SOLUTION SUBCUTANEOUS
Qty: 0 | Refills: 0 | Status: DISCONTINUED | OUTPATIENT
Start: 2017-09-27 | End: 2017-09-27

## 2017-09-27 RX ORDER — INSULIN LISPRO 100/ML
4 VIAL (ML) SUBCUTANEOUS
Qty: 0 | Refills: 0 | Status: DISCONTINUED | OUTPATIENT
Start: 2017-09-27 | End: 2017-09-27

## 2017-09-27 RX ORDER — POTASSIUM PHOSPHATE, MONOBASIC POTASSIUM PHOSPHATE, DIBASIC 236; 224 MG/ML; MG/ML
15 INJECTION, SOLUTION INTRAVENOUS ONCE
Qty: 0 | Refills: 0 | Status: COMPLETED | OUTPATIENT
Start: 2017-09-27 | End: 2017-09-27

## 2017-09-27 RX ORDER — INSULIN LISPRO 100/ML
5 VIAL (ML) SUBCUTANEOUS
Qty: 0 | Refills: 0 | Status: DISCONTINUED | OUTPATIENT
Start: 2017-09-27 | End: 2017-09-29

## 2017-09-27 RX ADMIN — Medication 100 MILLIGRAM(S): at 06:31

## 2017-09-27 RX ADMIN — HEPARIN SODIUM 5000 UNIT(S): 5000 INJECTION INTRAVENOUS; SUBCUTANEOUS at 06:31

## 2017-09-27 RX ADMIN — Medication 100 MILLIGRAM(S): at 12:28

## 2017-09-27 RX ADMIN — POLYETHYLENE GLYCOL 3350 17 GRAM(S): 17 POWDER, FOR SOLUTION ORAL at 17:27

## 2017-09-27 RX ADMIN — POLYETHYLENE GLYCOL 3350 17 GRAM(S): 17 POWDER, FOR SOLUTION ORAL at 06:31

## 2017-09-27 RX ADMIN — Medication 100 MILLIGRAM(S): at 22:14

## 2017-09-27 RX ADMIN — POTASSIUM PHOSPHATE, MONOBASIC POTASSIUM PHOSPHATE, DIBASIC 62.5 MILLIMOLE(S): 236; 224 INJECTION, SOLUTION INTRAVENOUS at 10:57

## 2017-09-27 RX ADMIN — CEFTRIAXONE 100 GRAM(S): 500 INJECTION, POWDER, FOR SOLUTION INTRAMUSCULAR; INTRAVENOUS at 10:44

## 2017-09-27 RX ADMIN — SENNA PLUS 2 TABLET(S): 8.6 TABLET ORAL at 22:14

## 2017-09-27 RX ADMIN — ONDANSETRON 4 MILLIGRAM(S): 8 TABLET, FILM COATED ORAL at 07:53

## 2017-09-27 RX ADMIN — Medication 6: at 12:27

## 2017-09-27 RX ADMIN — INSULIN GLARGINE 15 UNIT(S): 100 INJECTION, SOLUTION SUBCUTANEOUS at 17:45

## 2017-09-27 RX ADMIN — HEPARIN SODIUM 5000 UNIT(S): 5000 INJECTION INTRAVENOUS; SUBCUTANEOUS at 12:29

## 2017-09-27 RX ADMIN — Medication 2: at 22:12

## 2017-09-27 RX ADMIN — Medication 2: at 17:27

## 2017-09-27 RX ADMIN — Medication 10 MILLIGRAM(S): at 22:12

## 2017-09-27 RX ADMIN — HEPARIN SODIUM 5000 UNIT(S): 5000 INJECTION INTRAVENOUS; SUBCUTANEOUS at 22:14

## 2017-09-27 RX ADMIN — Medication 4: at 08:37

## 2017-09-27 NOTE — PROGRESS NOTE ADULT - PROBLEM SELECTOR PLAN 5
BP well controlled off all antihypertensives  -Will recocnile home meds   -DASH diet Positive UA with lower abdominal pain symptoms. Pt reports lower abdominal pain improved.    -Ceftriaxone 1g qD. Day 2  -F/u urine culture

## 2017-09-27 NOTE — PROGRESS NOTE ADULT - PROBLEM SELECTOR PLAN 2
Improved  -Will d/c 1/2 NS as patient tolerating diet Pt reports dysphagia to solids but not liquids. Unclear etiology differential includes achalasia,esophageal strictures,esophagitis from retching   -Will check barium swallow

## 2017-09-27 NOTE — CONSULT NOTE ADULT - PROBLEM SELECTOR RECOMMENDATION 2
Pt had + BOHB on admission, likely partially from diabetes, also with poor po intake.  Gap now down to 11, had more HONK picture, but with ketosis on admission, very mildly decreased bicarb but with dehydration and elevated osm serum.

## 2017-09-27 NOTE — CONSULT NOTE ADULT - PROBLEM SELECTOR RECOMMENDATION 9
Pt needs insulin now and on discharge and discussed this with her.  Would be helpful for nursing to provide teaching on insulin administration here.  Pt does know how to check BG.  She also lives with her daughter who is in nursing and can help with injections she says (and knows how to administer insulin).     For now, pt needs ongoign hydration.  As for insulin, would continue with 15Glargine qhs and 5 humalog tidac + SSI as written.  May need further modifications as time progresses.

## 2017-09-27 NOTE — CONSULT NOTE ADULT - SUBJECTIVE AND OBJECTIVE BOX
HPI:  78 yo F w PMHx of HTN, DM on metformin, p/w nausea, vomiting, lethargy, and lower abdominal pain.     Since the patient returned from [place] two weeks ago, she has been having decreased oral intake, not eating as much, and not feeling well. She reports nausea with one episode of non bilious non bloody emesis.Pt also endorses lower abd pain and constipation.  On admission, found to have BG in the 900s on BMP, also with pH 7.34 on VBG, BOHB of 6.8, osm of 319, and creatinine 2.  Was given IVF and started on insulin.      On discussion, she notes that she has h/o type 2 diabetes since 2002.  She has been on metformin only at home.  Lived with  who recently passed away.  Now living with daughter who works in medical field.  Pt has never been on insulin.  A1c on admission was 15.8.  She denies known complications of her diabetes although admits to not knowing full complication history.  Denies h/o hyopglycemia.      PAST MEDICAL & SURGICAL HISTORY:  HTN (hypertension)  DM (diabetes mellitus)  No significant past surgical history      FAMILY HISTORY: mother had diabetes type 2       Social History: lives with daughter, pt is from [place]    Outpatient Medications:    MEDICATIONS  (STANDING):  cefTRIAXone   IVPB 1 Gram(s) IV Intermittent every 24 hours  heparin  Injectable 5000 Unit(s) SubCutaneous every 8 hours  sodium chloride 0.45%. 1000 milliLiter(s) (75 mL/Hr) IV Continuous <Continuous>  insulin lispro (HumaLOG) corrective regimen sliding scale   SubCutaneous three times a day before meals  insulin lispro (HumaLOG) corrective regimen sliding scale   SubCutaneous at bedtime  dextrose 5%. 1000 milliLiter(s) (50 mL/Hr) IV Continuous <Continuous>  dextrose 50% Injectable 12.5 Gram(s) IV Push once  dextrose 50% Injectable 25 Gram(s) IV Push once  dextrose 50% Injectable 25 Gram(s) IV Push once  senna 2 Tablet(s) Oral at bedtime  docusate sodium 100 milliGRAM(s) Oral three times a day  polyethylene glycol 3350 17 Gram(s) Oral two times a day  insulin lispro Injectable (HumaLOG) 4 Unit(s) SubCutaneous three times a day before meals    MEDICATIONS  (PRN):  ondansetron Injectable 4 milliGRAM(s) IV Push every 6 hours PRN Nausea  dextrose Gel 1 Dose(s) Oral once PRN Blood Glucose LESS THAN 70 milliGRAM(s)/deciliter  glucagon  Injectable 1 milliGRAM(s) IntraMuscular once PRN Glucose LESS THAN 70 milligrams/deciliter      Allergies    No Known Allergies    Intolerances      Review of Systems:  Constitutional: No fever  Neuro: No tremors  HEENT: No pain  Cardiovascular: No chest pain, palpitations  Respiratory: No SOB, no cough  GI: No nausea, vomiting, + abdominal pain, + constipation   : No dysuria  Skin: no rash  Psych: no depression  Endocrine: +polyuria, polydipsia  Hem/lymph: no swelling    ALL OTHER SYSTEMS REVIEWED AND NEGATIVE    PHYSICAL EXAM:  VITALS: T(C): 36.7 (09-27-17 @ 15:49)  T(F): 98.1 (09-27-17 @ 15:49), Max: 98.3 (09-26-17 @ 21:59)  HR: 71 (09-27-17 @ 15:49) (64 - 86)  BP: 125/70 (09-27-17 @ 15:49) (124/66 - 152/72)  RR:  (12 - 18)  SpO2:  (99% - 100%)  Wt(kg): --  GENERAL: NAD, well-developed  EYES: No proptosis, no lid lag, anicteric  HEENT:  Atraumatic, Normocephalic, moist mucous membranes  RESPIRATORY: breathing comfortably on room air, no accessory muscle use or costal retractions  CARDIOVASCULAR: warm and well perfused, no peripheral edema  SKIN: Dry, intact, No rashes   MUSCULOSKELETAL: Full range of motion, no lordosis   NEURO: speech fluent, face symmetric   PSYCH: Alert and oriented x 3, normal affect, normal mood    CAPILLARY BLOOD GLUCOSE  405 (09-27 @ 12:06)  323 (09-27 @ 08:24)  137 (09-27 @ 02:49)  276 (09-26 @ 21:59)  294 (09-26 @ 19:50)  174 (09-26 @ 19:07)  235 (09-26 @ 18:00)  198 (09-26 @ 17:05)  188 (09-26 @ 16:10)  238 (09-26 @ 15:05)  299 (09-26 @ 14:06)  392 (09-26 @ 13:00)  495 (09-26 @ 12:00)  596 (09-26 @ 11:02)                            12.3   6.16  )-----------( 188      ( 27 Sep 2017 06:55 )             39.0       09-27    144  |  109<H>  |  15  ----------------------------<  264<H>  4.4   |  24  |  1.17    EGFR if : 53  EGFR if non : 46    Ca    8.6      09-27  Mg     1.9     09-27  Phos  1.8     09-27    TPro  6.7  /  Alb  3.5  /  TBili  0.2  /  DBili  x   /  AST  14  /  ALT  14  /  AlkPhos  82  09-26      Thyroid Function Tests:      Hemoglobin A1C, Whole Blood: 15.8 % <H> [4.0 - 5.6] (09-26-17 @ 15:41)          Radiology:
CHIEF COMPLAINT:    HPI:  77 year old female PMHx of DM on metformin; presents to the ED with worsening generalized weakness and decreased PO.     The patient for the last two week has been having decreased PO, not eating and not feeling well. The patient has been not feeling well since her  passed away 1 month ago. Also report having multiple other complaints including substeranl chest pain, mostly burning for the last two weeks. Also c/w of lower abn pain and constipation with little bm in the last week. Also had some n/v today. Does not check finger sticks at home. Only takes metformin.     Was consulted for DKA in the ED. elevated AG, elevated bhydroxy but no acidosis. Mentating well. With UTI. The patient was given 3.5 L initially in the ED, started on insulin gtt. Given lack of acidosis likely 2/2 to severe dehydrated and hyperosmolar states without AMS. Rapidly improved in the ED over several hours with fluids and insuline with now a closed AG and FS in the 200's. CE negative. EKG showed Inf wall TWI but no cardiac enzymes.    PAST MEDICAL & SURGICAL HISTORY:  DM (diabetes mellitus)  No significant past surgical history      FAMILY HISTORY:  No pertinent family history in first degree relatives      SOCIAL HISTORY:  Smoking: [ x] Never Smoked [ ] Former Smoker (__ packs x ___ years) [ ] Current Smoker  (__ packs x ___ years)  Substance Use: [ x] Never Used [ ] Used ____  EtOH Use:  Marital Status: [ ] Single [ ]  [ ]  [x ]   Sexual History:   Occupation:  Recent Travel:  Country of Birth:  Advance Directives:    Allergies    No Known Allergies    Intolerances        HOME MEDICATIONS:    REVIEW OF SYSTEMS:  Constitutional: [ ] negative [ ] fevers [ ] chills [ ] weight loss [ ] weight gain, + fatigue  HEENT: [x ] negative [ ] dry eyes [ ] eye irritation [ ] postnasal drip [ ] nasal congestion  CV: [ ] negative  [x ] chest pain [ ] orthopnea [ ] palpitations [ ] murmur  Resp: [x ] negative [ ] cough [ ] shortness of breath [ ] dyspnea [ ] wheezing [ ] sputum [ ] hemoptysis  GI: [ ] negative [ ] nausea [ ] vomiting [ ] diarrhea [ x] constipation [ ] abd pain [ ] dysphagia   : [ ] negative [ x] dysuria [ ] nocturia [ ] hematuria [ x] increased urinary frequency  Musculoskeletal: [ x] negative [ ] back pain [ ] myalgias [ ] arthralgias [ ] fracture  Skin: [ x] negative [ ] rash [ ] itch  Neurological: [x ] negative [ ] headache [ ] dizziness [ ] syncope [ ] weakness [ ] numbness  Psychiatric: [ ] negative [ ] anxiety [ x] depression  Endocrine: [ ] negative [ ] diabetes [ ] thyroid problem  Hematologic/Lymphatic: [ ] negative [ ] anemia [ ] bleeding problem  Allergic/Immunologic: [ ] negative [ ] itchy eyes [ ] nasal discharge [ ] hives [ ] angioedema  [ ] All other systems negative  [ ] Unable to assess ROS because ________    OBJECTIVE:  ICU Vital Signs Last 24 Hrs  T(C): 37.9 (26 Sep 2017 10:36), Max: 37.9 (26 Sep 2017 10:36)  T(F): 100.3 (26 Sep 2017 10:36), Max: 100.3 (26 Sep 2017 10:36)  HR: 89 (26 Sep 2017 15:02) (89 - 120)  BP: 138/77 (26 Sep 2017 15:02) (138/77 - 163/64)  BP(mean): --  ABP: --  ABP(mean): --  RR: 16 (26 Sep 2017 15:02) (16 - 22)  SpO2: 100% (26 Sep 2017 15:02) (99% - 100%)        CAPILLARY BLOOD GLUCOSE  198 (26 Sep 2017 17:05)            PHYSICAL EXAM:    Constitutional: well-developed; well-groomed; well-nourished; no distress, dry mucous membrane.   Eyes: PERRL; EOMI  ENMT: Normal oropharnxy, no oral lesions, no erythema, no exudates  Neck:  Supple; no JVD; normal thyroid gland  MSK/Back: normal shape; ROM intact; strength intact, no CVA tenderness. Normal strength in all ext, No joint swelling, no joint tenderenss, no joint erythema, no effusions  Respiratory: airway patent; breath sounds equal; good air movement, no wheezing, no crackles, no rhonchi. no increase in WOB  Cardiovascular: regular rate and rhythm  no rub , no murmur, no gallops.   Gastrointestinal: soft; no distention, normal BS, no TTP, no rebound, no guarding, no mass, no organomegaly, no ascites.  Extremities: no clubbing; no cyanosis; no pedal edema, non-tender to palpation, DP and Radial pulses intact.  Neurological: alert and oriented x 3; responds to pain; responds to verbal commands; sensation intact: CN nerves grossly intact.   Skin: warm and dry; color normal: no rash: no ulcers  Psychiatric: Calm, no SI/HI        LINES:     HOSPITAL MEDICATIONS:        insulin Infusion 4 Unit(s)/Hr IV Continuous <Continuous>      LABS:                        13.7   6.79  )-----------( 215      ( 26 Sep 2017 15:41 )             43.0     Hgb Trend: 13.7<--, 13.1<--, 15.8<--      158<H>  |  121<H>  |  22  ----------------------------<  269<H>  4.4   |  25  |  1.51<H>    Ca    9.6      26 Sep 2017 15:41  Phos  5.2       Mg     2.8         TPro  6.7  /  Alb  3.5  /  TBili  0.2  /  DBili  x   /  AST  14  /  ALT  14  /  AlkPhos  82      Creatinine Trend: 1.51<--, 1.55<--, 2.01<--  PT/INR - ( 26 Sep 2017 10:00 )   PT: 10.7 SEC;   INR: 0.96          PTT - ( 26 Sep 2017 10:00 )  PTT:22.8 SEC  Urinalysis Basic - ( 26 Sep 2017 10:00 )    Color: PLYEL / Appearance: CLEAR / S.032 / pH: 6.0  Gluc: >1000 / Ketone: MODERATE  / Bili: NEGATIVE / Urobili: NORMAL E.U.   Blood: NEGATIVE / Protein: NEGATIVE / Nitrite: NEGATIVE   Leuk Esterase: LARGE / RBC: 25-50 / WBC >50   Sq Epi: OCC / Non Sq Epi: x / Bacteria: FEW        Venous Blood Gas:   @ 15:20  7.32/56/28/24/45.4  VBG Lactate: 2.3  Venous Blood Gas:   @ 14:29  7.32/45/49/21/79.8  VBG Lactate: 2.7  Venous Blood Gas:   @ 09:45  7.34/41/59/21/88.5  VBG Lactate: 3.1      MICROBIOLOGY:     RADIOLOGY:  [ ] Reviewed and interpreted by me    EKG:  NSR, TWI in infero-lateral leads. No previous ekg for comparison.

## 2017-09-27 NOTE — PROGRESS NOTE ADULT - PROBLEM SELECTOR PLAN 6
Likely 2/2 dehydration. PT has not had BM as of yet   -Will start bowel regimen with senna colace miralax BP well controlled off all antihypertensives  -Will recocnile home meds   -DASH diet

## 2017-09-27 NOTE — CONSULT NOTE ADULT - PROBLEM SELECTOR RECOMMENDATION 3
May have acutely worsened sugars but with a1c of 15, expect that sugars were quite high already, leading to glycosuria and increased risk of UTI.

## 2017-09-27 NOTE — PROGRESS NOTE ADULT - PROBLEM SELECTOR PLAN 6
Likely 2/2 dehydration  -Will start bowel regimen with senna colace miralax Likely 2/2 dehydration. No BM yet during hospital stay.  -Will start bowel regimen with senna colace miralax

## 2017-09-27 NOTE — PROGRESS NOTE ADULT - PROBLEM SELECTOR PLAN 1
Pt still remains hyperglycemic on FS this AM despite lantus dosing. AG remains closed. PT s/p 30 units of insulin in 24 hour period.   -Monitor FS will adjust insulin at bedtime and   -FS before meals and at bedtime  -Sliding scale before meals and bedtime   -Endocrine consult pending

## 2017-09-27 NOTE — PROGRESS NOTE ADULT - PROBLEM SELECTOR PLAN 4
Positive UA with lower abdominal pain symptoms. Pt reports lower abdominal pain improved.    -Ceftriaxone 1g qD. Day 2  -F/u urine culture Likely prerenal as it improved significantly with IV hydration. Pt now resolved  -Will obtain outpatient labs to determine what is pts baseline renal function  -Hold nephrotoxins

## 2017-09-27 NOTE — PROGRESS NOTE ADULT - SUBJECTIVE AND OBJECTIVE BOX
Patient is a 77y old  Female who presents with a chief complaint of low blood sugar (26 Sep 2017 20:00)      SUBJECTIVE / OVERNIGHT EVENTS:    MEDICATIONS  (STANDING):  cefTRIAXone   IVPB 1 Gram(s) IV Intermittent every 24 hours  heparin  Injectable 5000 Unit(s) SubCutaneous every 8 hours  sodium chloride 0.45%. 1000 milliLiter(s) (75 mL/Hr) IV Continuous <Continuous>  insulin lispro (HumaLOG) corrective regimen sliding scale   SubCutaneous three times a day before meals  insulin lispro (HumaLOG) corrective regimen sliding scale   SubCutaneous at bedtime  dextrose 5%. 1000 milliLiter(s) (50 mL/Hr) IV Continuous <Continuous>  dextrose 50% Injectable 12.5 Gram(s) IV Push once  dextrose 50% Injectable 25 Gram(s) IV Push once  dextrose 50% Injectable 25 Gram(s) IV Push once  senna 2 Tablet(s) Oral at bedtime  docusate sodium 100 milliGRAM(s) Oral three times a day  polyethylene glycol 3350 17 Gram(s) Oral two times a day    MEDICATIONS  (PRN):  ondansetron Injectable 4 milliGRAM(s) IV Push every 6 hours PRN Nausea  dextrose Gel 1 Dose(s) Oral once PRN Blood Glucose LESS THAN 70 milliGRAM(s)/deciliter  glucagon  Injectable 1 milliGRAM(s) IntraMuscular once PRN Glucose LESS THAN 70 milligrams/deciliter      T(C): 36.8 (17 @ 05:10), Max: 37.9 (17 @ 10:36)  HR: 64 (17 @ 05:10) (64 - 120)  BP: 124/66 (17 @ 05:10) (124/66 - 163/64)  RR: 18 (17 @ 05:10) (12 - 22)  SpO2: 100% (17 @ 05:10) (99% - 100%)    CAPILLARY BLOOD GLUCOSE  137 (27 Sep 2017 02:49)  276 (26 Sep 2017 21:59)  294 (26 Sep 2017 19:50)  174 (26 Sep 2017 19:07)  235 (26 Sep 2017 18:00)  198 (26 Sep 2017 17:05)  188 (26 Sep 2017 16:10)  238 (26 Sep 2017 15:05)  299 (26 Sep 2017 14:06)  392 (26 Sep 2017 13:00)  495 (26 Sep 2017 12:00)  596 (26 Sep 2017 11:02)        I&O's Summary      PHYSICAL EXAM:  GENERAL:  HEAD:  EYES:  NECK:  CHEST/LUNG:  HEART:  ABDOMEN:  EXTREMITIES:  PSYCH:  NEUROLOGY:  SKIN:    LABS:                        13.7   6.79  )-----------( 215      ( 26 Sep 2017 15:41 )             43.0         149<H>  |  112<H>  |  19  ----------------------------<  305<H>  4.1   |  25  |  1.33<H>    Ca    8.6      26 Sep 2017 22:00  Phos  5.2       Mg     2.8         TPro  6.7  /  Alb  3.5  /  TBili  0.2  /  DBili  x   /  AST  14  /  ALT  14  /  AlkPhos  82      PT/INR - ( 26 Sep 2017 10:00 )   PT: 10.7 SEC;   INR: 0.96          PTT - ( 26 Sep 2017 10:00 )  PTT:22.8 SEC  CARDIAC MARKERS ( 26 Sep 2017 15:41 )  x     / < 0.06 ng/mL / 37 u/L / 1.42 ng/mL / x      CARDIAC MARKERS ( 26 Sep 2017 10:08 )  x     / < 0.06 ng/mL / 38 u/L / 1.52 ng/mL / x      CARDIAC MARKERS ( 26 Sep 2017 10:00 )  x     / < 0.06 ng/mL / 38 u/L / 1.00 ng/mL / x          Urinalysis Basic - ( 26 Sep 2017 10:00 )    Color: PLYEL / Appearance: CLEAR / S.032 / pH: 6.0  Gluc: >1000 / Ketone: MODERATE  / Bili: NEGATIVE / Urobili: NORMAL E.U.   Blood: NEGATIVE / Protein: NEGATIVE / Nitrite: NEGATIVE   Leuk Esterase: LARGE / RBC: 25-50 / WBC >50   Sq Epi: OCC / Non Sq Epi: x / Bacteria: FEW        RADIOLOGY & ADDITIONAL TESTS:    Imaging Personally Reviewed:     Consultant(s) Notes Reviewed:     Care Discussed with Consultants/Other Providers: BHAKTI HYDE  77y  Female      Patient is a 77y old  Female who presents with a chief complaint of low blood sugar (26 Sep 2017 20:00)      INTERVAL HPI/OVERNIGHT EVENTS:    Pt found in bed with bedpan on lab. She endorses nausea, a feeling like "something is stuck," and if she vomits she'll feel better. No episodes of vomiting overnight. Endorses epigastric pain. Pt also reports an episode of headache overnight, but doesn't remember when or what made it go away. No longer complaining of headache. No other concerns.    REVIEW OF SYSTEMS:  CONSTITUTIONAL: No fever, weight loss, or fatigue  EYES: No eye pain, visual disturbances, or discharge  ENMT:  No difficulty hearing, tinnitus, vertigo; No sinus or throat pain  NECK: No pain or stiffness  RESPIRATORY: No cough, wheezing, chills or hemoptysis; No shortness of breath  CARDIOVASCULAR: No chest pain, palpitations, dizziness, or leg swelling  GASTROINTESTINAL: No diarrhea or constipation. No vomiting, or hematemesis. No melena or hematochezia. +nausea. +epigastric pain.  GENITOURINARY: No dysuria, frequency, hematuria, or incontinence  NEUROLOGICAL: No memory loss, loss of strength, numbness, or tremors. +Headaches  MUSCULOSKELETAL: No joint pain or swelling; No muscle, back, or extremity pain  SKIN: No itching, burning, rashes, or lesions   LYMPH NODES: No enlarged glands  ENDOCRINE: No heat or cold intolerance; No hair loss  PSYCHIATRIC: No depression, anxiety, mood swings, or difficulty sleeping  HEME/LYMPH: No easy bruising, or bleeding gums  ALLERY AND IMMUNOLOGIC: No hives or eczema    T(C): 36.8 (17 @ 05:10), Max: 37.9 (17 @ 10:36)  HR: 64 (17 @ 05:10) (64 - 120)  BP: 124/66 (17 @ 05:10) (124/66 - 163/64)  RR: 18 (17 @ 05:10) (12 - 22)  SpO2: 100% (17 @ 05:10) (99% - 100%)  Wt(kg): --Vital Signs Last 24 Hrs  T(C): 36.8 (27 Sep 2017 05:10), Max: 37.9 (26 Sep 2017 10:36)  T(F): 98.2 (27 Sep 2017 05:10), Max: 100.3 (26 Sep 2017 10:36)  HR: 64 (27 Sep 2017 05:10) (64 - 120)  BP: 124/66 (27 Sep 2017 05:10) (124/66 - 163/64)  BP(mean): --  RR: 18 (27 Sep 2017 05:10) (12 - 22)  SpO2: 100% (27 Sep 2017 05:10) (99% - 100%)  Wt(kg): --    PHYSICAL EXAM:  GENERAL: NAD, well-groomed, well-developed  HEAD:  Atraumatic, Normocephalic  EYES: EOMI, PERRLA, conjunctiva and sclera clear  ENMT: Moist mucous membranes, Good dentition, No lesions  NECK: Supple, No JVD, Normal thyroid  NERVOUS SYSTEM:  Alert & Oriented X3, Good concentration; Motor Strength 5/5 B/L upper and lower extremities  CHEST/LUNG: Clear to auscultation bilaterally; No rales, rhonchi, wheezing, or rubs  HEART: Regular rate and rhythm; No murmurs, rubs, or gallops  ABDOMEN: Soft, Nontender, Nondistended; Bowel sounds present  EXTREMITIES:  2+ Peripheral Pulses, No clubbing, cyanosis, or edema  LYMPH: No lymphadenopathy noted  SKIN: No rashes or lesions    Consultant(s) Notes Reviewed:  [x ] YES  [ ] NO      LABS:                        12.3   6.16  )-----------( 188      ( 27 Sep 2017 06:55 )             39.0         149<H>  |  112<H>  |  19  ----------------------------<  305<H>  4.1   |  25  |  1.33<H>    Ca    8.6      26 Sep 2017 22:00  Phos  5.2       Mg     2.8         TPro  6.7  /  Alb  3.5  /  TBili  0.2  /  DBili  x   /  AST  14  /  ALT  14  /  AlkPhos  82  26    PT/INR - ( 26 Sep 2017 10:00 )   PT: 10.7 SEC;   INR: 0.96          PTT - ( 26 Sep 2017 10:00 )  PTT:22.8 SEC  Urinalysis Basic - ( 26 Sep 2017 10:00 )    Color: PLYEL / Appearance: CLEAR / S.032 / pH: 6.0  Gluc: >1000 / Ketone: MODERATE  / Bili: NEGATIVE / Urobili: NORMAL E.U.   Blood: NEGATIVE / Protein: NEGATIVE / Nitrite: NEGATIVE   Leuk Esterase: LARGE / RBC: 25-50 / WBC >50   Sq Epi: OCC / Non Sq Epi: x / Bacteria: FEW      CAPILLARY BLOOD GLUCOSE  137 (27 Sep 2017 02:49)  276 (26 Sep 2017 21:59)  294 (26 Sep 2017 19:50)  174 (26 Sep 2017 19:07)  235 (26 Sep 2017 18:00)  198 (26 Sep 2017 17:05)  188 (26 Sep 2017 16:10)  238 (26 Sep 2017 15:05)  299 (26 Sep 2017 14:06)  392 (26 Sep 2017 13:00)  495 (26 Sep 2017 12:00)  596 (26 Sep 2017 11:02)            Urinalysis Basic - ( 26 Sep 2017 10:00 )    Color: PLYEL / Appearance: CLEAR / S.032 / pH: 6.0  Gluc: >1000 / Ketone: MODERATE  / Bili: NEGATIVE / Urobili: NORMAL E.U.   Blood: NEGATIVE / Protein: NEGATIVE / Nitrite: NEGATIVE   Leuk Esterase: LARGE / RBC: 25-50 / WBC >50   Sq Epi: OCC / Non Sq Epi: x / Bacteria: FEW        RADIOLOGY & ADDITIONAL TESTS:    No new imaging overnight.    Imaging Personally Reviewed:  [X] YES  [ ] NO BHAKTI HYDE  77y  Female      Patient is a 77y old  Female who presents with a chief complaint of low blood sugar (26 Sep 2017 20:00)      INTERVAL HPI/OVERNIGHT EVENTS:    Pt found in bed with bedpan on lab. She endorses nausea, a feeling like "something is stuck," and if she vomits she'll feel better. No episodes of vomiting overnight. Endorses epigastric pain. Pt also reports an episode of headache overnight, but doesn't remember when or what made it go away. No longer complaining of headache. Denies lower abdominal pain. Denies CP, SOB, diarrhea, or dysphagia.    REVIEW OF SYSTEMS:  CONSTITUTIONAL: No fever, weight loss, or fatigue  EYES: No eye pain, visual disturbances, or discharge  ENMT:  No difficulty hearing, tinnitus, vertigo; No sinus or throat pain  NECK: No pain or stiffness  RESPIRATORY: No cough, wheezing, chills or hemoptysis; No shortness of breath  CARDIOVASCULAR: No chest pain, palpitations, dizziness, or leg swelling  GASTROINTESTINAL: No diarrhea or constipation. No vomiting, or hematemesis. No melena or hematochezia. +nausea. +epigastric pain.  GENITOURINARY: No dysuria, frequency, hematuria, or incontinence  NEUROLOGICAL: No memory loss, loss of strength, numbness, or tremors. +Headaches  MUSCULOSKELETAL: No joint pain or swelling; No muscle, back, or extremity pain  SKIN: No itching, burning, rashes, or lesions   LYMPH NODES: No enlarged glands  ENDOCRINE: No heat or cold intolerance; No hair loss  PSYCHIATRIC: No depression, anxiety, mood swings, or difficulty sleeping  HEME/LYMPH: No easy bruising, or bleeding gums  ALLERY AND IMMUNOLOGIC: No hives or eczema    T(C): 36.8 (17 @ 05:10), Max: 37.9 (17 @ 10:36)  HR: 64 (17 @ 05:10) (64 - 120)  BP: 124/66 (17 @ 05:10) (124/66 - 163/64)  RR: 18 (17 @ 05:10) (12 - 22)  SpO2: 100% (17 @ 05:10) (99% - 100%)  Wt(kg): --Vital Signs Last 24 Hrs  T(C): 36.8 (27 Sep 2017 05:10), Max: 37.9 (26 Sep 2017 10:36)  T(F): 98.2 (27 Sep 2017 05:10), Max: 100.3 (26 Sep 2017 10:36)  HR: 64 (27 Sep 2017 05:10) (64 - 120)  BP: 124/66 (27 Sep 2017 05:10) (124/66 - 163/64)  BP(mean): --  RR: 18 (27 Sep 2017 05:10) (12 - 22)  SpO2: 100% (27 Sep 2017 05:10) (99% - 100%)  Wt(kg): --    PHYSICAL EXAM:  GENERAL: NAD, well-groomed, well-developed  HEAD:  Atraumatic, Normocephalic  EYES: EOMI, PERRLA, conjunctiva and sclera clear  ENMT: Moist mucous membranes, Good dentition, No lesions  NECK: Supple, No JVD, Normal thyroid  NERVOUS SYSTEM:  Alert & Oriented X3, Good concentration; Motor Strength 5/5 B/L upper and lower extremities  CHEST/LUNG: Clear to auscultation bilaterally; No rales, rhonchi, wheezing, or rubs  HEART: Regular rate and rhythm; No murmurs, rubs, or gallops  ABDOMEN: Soft, Nontender, Nondistended; Bowel sounds present  EXTREMITIES:  2+ Peripheral Pulses, No clubbing, cyanosis, or edema  LYMPH: No lymphadenopathy noted  SKIN: No rashes or lesions    Consultant(s) Notes Reviewed:  [x ] YES  [ ] NO      LABS:                        12.3   6.16  )-----------( 188      ( 27 Sep 2017 06:55 )             39.0         149<H>  |  112<H>  |  19  ----------------------------<  305<H>  4.1   |  25  |  1.33<H>    Ca    8.6      26 Sep 2017 22:00  Phos  5.2       Mg     2.8         TPro  6.7  /  Alb  3.5  /  TBili  0.2  /  DBili  x   /  AST  14  /  ALT  14  /  AlkPhos  82  26    PT/INR - ( 26 Sep 2017 10:00 )   PT: 10.7 SEC;   INR: 0.96          PTT - ( 26 Sep 2017 10:00 )  PTT:22.8 SEC  Urinalysis Basic - ( 26 Sep 2017 10:00 )    Color: PLYEL / Appearance: CLEAR / S.032 / pH: 6.0  Gluc: >1000 / Ketone: MODERATE  / Bili: NEGATIVE / Urobili: NORMAL E.U.   Blood: NEGATIVE / Protein: NEGATIVE / Nitrite: NEGATIVE   Leuk Esterase: LARGE / RBC: 25-50 / WBC >50   Sq Epi: OCC / Non Sq Epi: x / Bacteria: FEW      CAPILLARY BLOOD GLUCOSE  137 (27 Sep 2017 02:49)  276 (26 Sep 2017 21:59)  294 (26 Sep 2017 19:50)  174 (26 Sep 2017 19:07)  235 (26 Sep 2017 18:00)  198 (26 Sep 2017 17:05)  188 (26 Sep 2017 16:10)  238 (26 Sep 2017 15:05)  299 (26 Sep 2017 14:06)  392 (26 Sep 2017 13:00)  495 (26 Sep 2017 12:00)  596 (26 Sep 2017 11:02)            Urinalysis Basic - ( 26 Sep 2017 10:00 )    Color: PLYEL / Appearance: CLEAR / S.032 / pH: 6.0  Gluc: >1000 / Ketone: MODERATE  / Bili: NEGATIVE / Urobili: NORMAL E.U.   Blood: NEGATIVE / Protein: NEGATIVE / Nitrite: NEGATIVE   Leuk Esterase: LARGE / RBC: 25-50 / WBC >50   Sq Epi: OCC / Non Sq Epi: x / Bacteria: FEW        RADIOLOGY & ADDITIONAL TESTS:    No new imaging overnight.    Imaging Personally Reviewed:  [X] YES  [ ] NO

## 2017-09-27 NOTE — PROGRESS NOTE ADULT - PROBLEM SELECTOR PLAN 7
DVT PPX:HSQ  Dispo:Pending w/u Likely 2/2 dehydration. PT has not had BM as of yet   -Will start bowel regimen with senna colace miralax

## 2017-09-27 NOTE — PROGRESS NOTE ADULT - PROBLEM SELECTOR PLAN 4
Positive UA with lower abdominal pain symptoms   -Ceftriaxone 1g qD  -F/u urine culture Positive UA with lower abdominal pain symptoms. Lower abdominal pain improved.   -Ceftriaxone 1g qD, day2  -F/u urine culture

## 2017-09-27 NOTE — CONSULT NOTE ADULT - ASSESSMENT
76 yo female with type 2 diabetes, here with HONK.  Has type 2 diabetes since 2002, on metformin only outpt and has never been on insulin.  Admitted with normal ph of 7.34 but with elevated BOHB, also poor po intake, and with elevated serum osm and sugar of 900s on BMP. 78 yo female with type 2 diabetes, here with [facility]ONK.  Has type 2 diabetes since 2002, on metformin only outpt and has never been on insulin.  Admitted with normal ph of 7.34 but with elevated BOHB, also poor po intake, and with elevated serum osm and sugar of 900s on BMP.  Pt was quite ill on admission and is a complex pt requiring high level decision making .

## 2017-09-27 NOTE — PROGRESS NOTE ADULT - PROBLEM SELECTOR PLAN 2
Likely 2/2 dehydration from poor PO intake and elevated glucose.   -C/w 1/2 NS @ 75 cc hour  -Monitor I/Os   - at 10pm Na improving this morning on diet.  -Will d/c IVF as pt tolerating diet.

## 2017-09-27 NOTE — PROGRESS NOTE ADULT - PROBLEM SELECTOR PLAN 3
Likely prerenal in setting of dehydration. Unclear patients baseline but improving  -Will obtain outpatient labs to determine what is pts baseline renal function  -Hold nephrotoxins Likely prerenal in setting of dehydration, and improvement on IV hydration.  -Will obtain outpatient labs to determine what is pts baseline renal function  -Hold nephrotoxins

## 2017-09-27 NOTE — PROGRESS NOTE ADULT - PROBLEM SELECTOR PLAN 1
Labs revealed elevated anion gap, elevated ketones (beta-hydroxybutyrate), but normal pH values. Due to the absence of acidosis, pt not in DKA and likely in hyperosmolar state. s/p Lantus 16  -FS q4  -Sliding scale   -F/u Endocrine consult   -IF AG worsens will consult MICU. AG at 10pm was 12.

## 2017-09-27 NOTE — PHYSICAL THERAPY INITIAL EVALUATION ADULT - PERTINENT HX OF CURRENT PROBLEM, REHAB EVAL
76 yo F w PMHx of HTN, DM on metformin, p/w nausea, vomiting, lethargy, and lower abdominal pain.  Found to have hyperglycemia and abnormal lab values

## 2017-09-27 NOTE — PROGRESS NOTE ADULT - PROBLEM SELECTOR PLAN 3
Likely prerenal as it improved significantly with IV hydration. Pt now resolved  -Will obtain outpatient labs to determine what is pts baseline renal function  -Hold nephrotoxins Improved  -Will d/c 1/2 NS as patient tolerating diet

## 2017-09-27 NOTE — PROGRESS NOTE ADULT - PROBLEM SELECTOR PLAN 5
BP elevated.  -Will reconcile home meds   -If SBP>180 will give IV hydralazine BP well controlled.  -Will reconcile home meds

## 2017-09-27 NOTE — PROGRESS NOTE ADULT - SUBJECTIVE AND OBJECTIVE BOX
Patient is a 77y old  Female who presents with a chief complaint of low blood sugar (26 Sep 2017 20:00)        SUBJECTIVE / OVERNIGHT EVENTS:No overnight events. Pt reports feeling nauseous this morning similar to what she has been feeling these past two weeks. She denies any chest pain,SOB, dysphagia,diarrhea. she does endorse constipation      MEDICATIONS  (STANDING):  cefTRIAXone   IVPB 1 Gram(s) IV Intermittent every 24 hours  heparin  Injectable 5000 Unit(s) SubCutaneous every 8 hours  sodium chloride 0.45%. 1000 milliLiter(s) (75 mL/Hr) IV Continuous <Continuous>  insulin lispro (HumaLOG) corrective regimen sliding scale   SubCutaneous three times a day before meals  insulin lispro (HumaLOG) corrective regimen sliding scale   SubCutaneous at bedtime  dextrose 5%. 1000 milliLiter(s) (50 mL/Hr) IV Continuous <Continuous>  dextrose 50% Injectable 12.5 Gram(s) IV Push once  dextrose 50% Injectable 25 Gram(s) IV Push once  dextrose 50% Injectable 25 Gram(s) IV Push once  senna 2 Tablet(s) Oral at bedtime  docusate sodium 100 milliGRAM(s) Oral three times a day  polyethylene glycol 3350 17 Gram(s) Oral two times a day    MEDICATIONS  (PRN):  ondansetron Injectable 4 milliGRAM(s) IV Push every 6 hours PRN Nausea  dextrose Gel 1 Dose(s) Oral once PRN Blood Glucose LESS THAN 70 milliGRAM(s)/deciliter  glucagon  Injectable 1 milliGRAM(s) IntraMuscular once PRN Glucose LESS THAN 70 milligrams/deciliter        CAPILLARY BLOOD GLUCOSE  323 (27 Sep 2017 08:24)  137 (27 Sep 2017 02:49)  276 (26 Sep 2017 21:59)  294 (26 Sep 2017 19:50)  174 (26 Sep 2017 19:07)  235 (26 Sep 2017 18:00)  198 (26 Sep 2017 17:05)  188 (26 Sep 2017 16:10)  238 (26 Sep 2017 15:05)  299 (26 Sep 2017 14:06)  392 (26 Sep 2017 13:00)  495 (26 Sep 2017 12:00)  596 (26 Sep 2017 11:02)        I&O's Summary      PHYSICAL EXAM  GENERAL: NAD, well-developed  HEAD:  Atraumatic, Normocephalic  EYES: EOMI, PERRLA, conjunctiva and sclera clear  NECK: Supple, No JVD  CHEST/LUNG: Clear to auscultation bilaterally; No wheeze  HEART: Regular rate and rhythm; No murmurs, rubs, or gallops  ABDOMEN: Soft, Nontender, Nondistended; Bowel sounds present  EXTREMITIES:  2+ Peripheral Pulses, No clubbing, cyanosis, or edema  PSYCH: AAOx3  SKIN: No rashes or lesions    LABS:                        12.3   6.16  )-----------( 188      ( 27 Sep 2017 06:55 )             39.0         144  |  109<H>  |  15  ----------------------------<  264<H>  4.4   |  24  |  1.17    Ca    8.6      27 Sep 2017 06:55  Phos  1.8       Mg     1.9         TPro  6.7  /  Alb  3.5  /  TBili  0.2  /  DBili  x   /  AST  14  /  ALT  14  /  AlkPhos  82      PT/INR - ( 26 Sep 2017 10:00 )   PT: 10.7 SEC;   INR: 0.96          PTT - ( 26 Sep 2017 10:00 )  PTT:22.8 SEC  CARDIAC MARKERS ( 26 Sep 2017 15:41 )  x     / < 0.06 ng/mL / 37 u/L / 1.42 ng/mL / x      CARDIAC MARKERS ( 26 Sep 2017 10:08 )  x     / < 0.06 ng/mL / 38 u/L / 1.52 ng/mL / x      CARDIAC MARKERS ( 26 Sep 2017 10:00 )  x     / < 0.06 ng/mL / 38 u/L / 1.00 ng/mL / x          Urinalysis Basic - ( 26 Sep 2017 10:00 )    Color: PLYEL / Appearance: CLEAR / S.032 / pH: 6.0  Gluc: >1000 / Ketone: MODERATE  / Bili: NEGATIVE / Urobili: NORMAL E.U.   Blood: NEGATIVE / Protein: NEGATIVE / Nitrite: NEGATIVE   Leuk Esterase: LARGE / RBC: 25-50 / WBC >50   Sq Epi: OCC / Non Sq Epi: x / Bacteria: FEW        RADIOLOGY & ADDITIONAL TESTS:    Imaging Personally Reviewed:  Consultant(s) Notes Reviewed:    Care Discussed with Consultants/Other Providers: Patient is a 77y old  Female who presents with a chief complaint of low blood sugar (26 Sep 2017 20:00)        SUBJECTIVE / OVERNIGHT EVENTS:No overnight events. Pt reports feeling nauseous this morning similar to what she has been feeling these past two weeks. She denies any chest pain,SOB,diarrhea. she does endorse constipation. She reports some difficulty with swallowing foods.      MEDICATIONS  (STANDING):  cefTRIAXone   IVPB 1 Gram(s) IV Intermittent every 24 hours  heparin  Injectable 5000 Unit(s) SubCutaneous every 8 hours  sodium chloride 0.45%. 1000 milliLiter(s) (75 mL/Hr) IV Continuous <Continuous>  insulin lispro (HumaLOG) corrective regimen sliding scale   SubCutaneous three times a day before meals  insulin lispro (HumaLOG) corrective regimen sliding scale   SubCutaneous at bedtime  dextrose 5%. 1000 milliLiter(s) (50 mL/Hr) IV Continuous <Continuous>  dextrose 50% Injectable 12.5 Gram(s) IV Push once  dextrose 50% Injectable 25 Gram(s) IV Push once  dextrose 50% Injectable 25 Gram(s) IV Push once  senna 2 Tablet(s) Oral at bedtime  docusate sodium 100 milliGRAM(s) Oral three times a day  polyethylene glycol 3350 17 Gram(s) Oral two times a day    MEDICATIONS  (PRN):  ondansetron Injectable 4 milliGRAM(s) IV Push every 6 hours PRN Nausea  dextrose Gel 1 Dose(s) Oral once PRN Blood Glucose LESS THAN 70 milliGRAM(s)/deciliter  glucagon  Injectable 1 milliGRAM(s) IntraMuscular once PRN Glucose LESS THAN 70 milligrams/deciliter        CAPILLARY BLOOD GLUCOSE  323 (27 Sep 2017 08:24)  137 (27 Sep 2017 02:49)  276 (26 Sep 2017 21:59)  294 (26 Sep 2017 19:50)  174 (26 Sep 2017 19:07)  235 (26 Sep 2017 18:00)  198 (26 Sep 2017 17:05)  188 (26 Sep 2017 16:10)  238 (26 Sep 2017 15:05)  299 (26 Sep 2017 14:06)  392 (26 Sep 2017 13:00)  495 (26 Sep 2017 12:00)  596 (26 Sep 2017 11:02)        I&O's Summary      PHYSICAL EXAM  GENERAL: NAD, well-developed  HEAD:  Atraumatic, Normocephalic  EYES: EOMI, PERRLA, conjunctiva and sclera clear  NECK: Supple, No JVD  CHEST/LUNG: Clear to auscultation bilaterally; No wheeze  HEART: Regular rate and rhythm; No murmurs, rubs, or gallops  ABDOMEN: Soft, Nontender, Nondistended; Bowel sounds present  EXTREMITIES:  2+ Peripheral Pulses, No clubbing, cyanosis, or edema  PSYCH: AAOx3  SKIN: No rashes or lesions    LABS:                        12.3   6.16  )-----------( 188      ( 27 Sep 2017 06:55 )             39.0         144  |  109<H>  |  15  ----------------------------<  264<H>  4.4   |  24  |  1.17    Ca    8.6      27 Sep 2017 06:55  Phos  1.8       Mg     1.9         TPro  6.7  /  Alb  3.5  /  TBili  0.2  /  DBili  x   /  AST  14  /  ALT  14  /  AlkPhos  82      PT/INR - ( 26 Sep 2017 10:00 )   PT: 10.7 SEC;   INR: 0.96          PTT - ( 26 Sep 2017 10:00 )  PTT:22.8 SEC  CARDIAC MARKERS ( 26 Sep 2017 15:41 )  x     / < 0.06 ng/mL / 37 u/L / 1.42 ng/mL / x      CARDIAC MARKERS ( 26 Sep 2017 10:08 )  x     / < 0.06 ng/mL / 38 u/L / 1.52 ng/mL / x      CARDIAC MARKERS ( 26 Sep 2017 10:00 )  x     / < 0.06 ng/mL / 38 u/L / 1.00 ng/mL / x          Urinalysis Basic - ( 26 Sep 2017 10:00 )    Color: PLYEL / Appearance: CLEAR / S.032 / pH: 6.0  Gluc: >1000 / Ketone: MODERATE  / Bili: NEGATIVE / Urobili: NORMAL E.U.   Blood: NEGATIVE / Protein: NEGATIVE / Nitrite: NEGATIVE   Leuk Esterase: LARGE / RBC: 25-50 / WBC >50   Sq Epi: OCC / Non Sq Epi: x / Bacteria: FEW        RADIOLOGY & ADDITIONAL TESTS:    Imaging Personally Reviewed:  Consultant(s) Notes Reviewed:    Care Discussed with Consultants/Other Providers:

## 2017-09-28 LAB
BASOPHILS # BLD AUTO: 0.02 K/UL — SIGNIFICANT CHANGE UP (ref 0–0.2)
BASOPHILS NFR BLD AUTO: 0.5 % — SIGNIFICANT CHANGE UP (ref 0–2)
BUN SERPL-MCNC: 11 MG/DL — SIGNIFICANT CHANGE UP (ref 7–23)
CALCIUM SERPL-MCNC: 8.2 MG/DL — LOW (ref 8.4–10.5)
CHLORIDE SERPL-SCNC: 102 MMOL/L — SIGNIFICANT CHANGE UP (ref 98–107)
CO2 SERPL-SCNC: 26 MMOL/L — SIGNIFICANT CHANGE UP (ref 22–31)
CREAT SERPL-MCNC: 0.99 MG/DL — SIGNIFICANT CHANGE UP (ref 0.5–1.3)
EOSINOPHIL # BLD AUTO: 0.07 K/UL — SIGNIFICANT CHANGE UP (ref 0–0.5)
EOSINOPHIL NFR BLD AUTO: 1.8 % — SIGNIFICANT CHANGE UP (ref 0–6)
GLUCOSE SERPL-MCNC: 147 MG/DL — HIGH (ref 70–99)
HCT VFR BLD CALC: 33.7 % — LOW (ref 34.5–45)
HGB BLD-MCNC: 11 G/DL — LOW (ref 11.5–15.5)
IMM GRANULOCYTES # BLD AUTO: 0.01 # — SIGNIFICANT CHANGE UP
IMM GRANULOCYTES NFR BLD AUTO: 0.3 % — SIGNIFICANT CHANGE UP (ref 0–1.5)
LYMPHOCYTES # BLD AUTO: 1.7 K/UL — SIGNIFICANT CHANGE UP (ref 1–3.3)
LYMPHOCYTES # BLD AUTO: 43.9 % — SIGNIFICANT CHANGE UP (ref 13–44)
MAGNESIUM SERPL-MCNC: 1.7 MG/DL — SIGNIFICANT CHANGE UP (ref 1.6–2.6)
MCHC RBC-ENTMCNC: 28.7 PG — SIGNIFICANT CHANGE UP (ref 27–34)
MCHC RBC-ENTMCNC: 32.6 % — SIGNIFICANT CHANGE UP (ref 32–36)
MCV RBC AUTO: 88 FL — SIGNIFICANT CHANGE UP (ref 80–100)
MONOCYTES # BLD AUTO: 0.42 K/UL — SIGNIFICANT CHANGE UP (ref 0–0.9)
MONOCYTES NFR BLD AUTO: 10.9 % — SIGNIFICANT CHANGE UP (ref 2–14)
NEUTROPHILS # BLD AUTO: 1.65 K/UL — LOW (ref 1.8–7.4)
NEUTROPHILS NFR BLD AUTO: 42.6 % — LOW (ref 43–77)
NRBC # FLD: 0 — SIGNIFICANT CHANGE UP
PHOSPHATE SERPL-MCNC: 2.2 MG/DL — LOW (ref 2.5–4.5)
PLATELET # BLD AUTO: 159 K/UL — SIGNIFICANT CHANGE UP (ref 150–400)
PMV BLD: 11.5 FL — SIGNIFICANT CHANGE UP (ref 7–13)
POTASSIUM SERPL-MCNC: 3.7 MMOL/L — SIGNIFICANT CHANGE UP (ref 3.5–5.3)
POTASSIUM SERPL-SCNC: 3.7 MMOL/L — SIGNIFICANT CHANGE UP (ref 3.5–5.3)
RBC # BLD: 3.83 M/UL — SIGNIFICANT CHANGE UP (ref 3.8–5.2)
RBC # FLD: 13.2 % — SIGNIFICANT CHANGE UP (ref 10.3–14.5)
SODIUM SERPL-SCNC: 138 MMOL/L — SIGNIFICANT CHANGE UP (ref 135–145)
WBC # BLD: 3.87 K/UL — SIGNIFICANT CHANGE UP (ref 3.8–10.5)
WBC # FLD AUTO: 3.87 K/UL — SIGNIFICANT CHANGE UP (ref 3.8–10.5)

## 2017-09-28 PROCEDURE — 99232 SBSQ HOSP IP/OBS MODERATE 35: CPT

## 2017-09-28 PROCEDURE — 99233 SBSQ HOSP IP/OBS HIGH 50: CPT | Mod: GC

## 2017-09-28 RX ORDER — POLYETHYLENE GLYCOL 3350 17 G/17G
0 POWDER, FOR SOLUTION ORAL
Qty: 0 | Refills: 0 | COMMUNITY

## 2017-09-28 RX ORDER — PANTOPRAZOLE SODIUM 20 MG/1
40 TABLET, DELAYED RELEASE ORAL
Qty: 0 | Refills: 0 | Status: DISCONTINUED | OUTPATIENT
Start: 2017-09-28 | End: 2017-09-29

## 2017-09-28 RX ORDER — AMLODIPINE BESYLATE 2.5 MG/1
5 TABLET ORAL DAILY
Qty: 0 | Refills: 0 | Status: DISCONTINUED | OUTPATIENT
Start: 2017-09-28 | End: 2017-09-29

## 2017-09-28 RX ORDER — AMLODIPINE BESYLATE 2.5 MG/1
5 TABLET ORAL DAILY
Qty: 0 | Refills: 0 | Status: DISCONTINUED | OUTPATIENT
Start: 2017-09-28 | End: 2017-09-28

## 2017-09-28 RX ORDER — ASPIRIN/CALCIUM CARB/MAGNESIUM 324 MG
1 TABLET ORAL
Qty: 0 | Refills: 0 | COMMUNITY

## 2017-09-28 RX ORDER — SIMVASTATIN 20 MG/1
40 TABLET, FILM COATED ORAL AT BEDTIME
Qty: 0 | Refills: 0 | Status: DISCONTINUED | OUTPATIENT
Start: 2017-09-28 | End: 2017-09-29

## 2017-09-28 RX ORDER — ASPIRIN/CALCIUM CARB/MAGNESIUM 324 MG
81 TABLET ORAL DAILY
Qty: 0 | Refills: 0 | Status: DISCONTINUED | OUTPATIENT
Start: 2017-09-28 | End: 2017-09-29

## 2017-09-28 RX ORDER — SUCRALFATE 1 G
1 TABLET ORAL THREE TIMES A DAY
Qty: 0 | Refills: 0 | Status: DISCONTINUED | OUTPATIENT
Start: 2017-09-28 | End: 2017-09-29

## 2017-09-28 RX ORDER — POTASSIUM PHOSPHATE, MONOBASIC POTASSIUM PHOSPHATE, DIBASIC 236; 224 MG/ML; MG/ML
15 INJECTION, SOLUTION INTRAVENOUS ONCE
Qty: 0 | Refills: 0 | Status: COMPLETED | OUTPATIENT
Start: 2017-09-28 | End: 2017-09-28

## 2017-09-28 RX ADMIN — POTASSIUM PHOSPHATE, MONOBASIC POTASSIUM PHOSPHATE, DIBASIC 62.5 MILLIMOLE(S): 236; 224 INJECTION, SOLUTION INTRAVENOUS at 10:24

## 2017-09-28 RX ADMIN — Medication 5 UNIT(S): at 17:17

## 2017-09-28 RX ADMIN — Medication 5 UNIT(S): at 12:30

## 2017-09-28 RX ADMIN — HEPARIN SODIUM 5000 UNIT(S): 5000 INJECTION INTRAVENOUS; SUBCUTANEOUS at 15:03

## 2017-09-28 RX ADMIN — Medication 2: at 12:30

## 2017-09-28 RX ADMIN — HEPARIN SODIUM 5000 UNIT(S): 5000 INJECTION INTRAVENOUS; SUBCUTANEOUS at 06:07

## 2017-09-28 RX ADMIN — Medication 1 GRAM(S): at 23:09

## 2017-09-28 RX ADMIN — Medication 5 UNIT(S): at 08:43

## 2017-09-28 RX ADMIN — SODIUM CHLORIDE 75 MILLILITER(S): 9 INJECTION, SOLUTION INTRAVENOUS at 06:08

## 2017-09-28 RX ADMIN — Medication 1: at 17:17

## 2017-09-28 RX ADMIN — POLYETHYLENE GLYCOL 3350 17 GRAM(S): 17 POWDER, FOR SOLUTION ORAL at 17:17

## 2017-09-28 RX ADMIN — Medication 100 MILLIGRAM(S): at 23:08

## 2017-09-28 RX ADMIN — CEFTRIAXONE 100 GRAM(S): 500 INJECTION, POWDER, FOR SOLUTION INTRAMUSCULAR; INTRAVENOUS at 09:39

## 2017-09-28 RX ADMIN — SENNA PLUS 2 TABLET(S): 8.6 TABLET ORAL at 23:08

## 2017-09-28 RX ADMIN — AMLODIPINE BESYLATE 5 MILLIGRAM(S): 2.5 TABLET ORAL at 15:03

## 2017-09-28 RX ADMIN — SIMVASTATIN 40 MILLIGRAM(S): 20 TABLET, FILM COATED ORAL at 23:09

## 2017-09-28 RX ADMIN — Medication 81 MILLIGRAM(S): at 15:03

## 2017-09-28 RX ADMIN — Medication 100 MILLIGRAM(S): at 15:03

## 2017-09-28 RX ADMIN — HEPARIN SODIUM 5000 UNIT(S): 5000 INJECTION INTRAVENOUS; SUBCUTANEOUS at 23:08

## 2017-09-28 RX ADMIN — INSULIN GLARGINE 15 UNIT(S): 100 INJECTION, SOLUTION SUBCUTANEOUS at 17:17

## 2017-09-28 NOTE — DIETITIAN INITIAL EVALUATION ADULT. - NS AS NUTRI INTERV COLLABORAT3
1)Add Glucerna Shake 8oz PO 2x daily to Consistent Carbohydrate, DASH/TLC (cholesterol & Na restricted) diet.                  2)Suggest outpatient endocrinology & CDE f/u          3)Follow up w swallow evaluation findings/recommendations              4)Obtain daily weights     5)RDN remains available.  Clementine Castle RDN, CD/N  pager [phone]

## 2017-09-28 NOTE — DIETITIAN INITIAL EVALUATION ADULT. - NS AS NUTRI DX KNOWLEDGE BELIEFS
Food - and nutrition - related knowledge deficit/Self - monitoring deficit/Limited adherence to nutrition - related recommendations

## 2017-09-28 NOTE — PROGRESS NOTE ADULT - PROBLEM SELECTOR PLAN 1
Pt FS this AM well controlled but she had elevated sugars yesterday requiring 14 units of extra coverage   -Will continue with lantus at current dose. Would increase Humalog to 8 units before meals.   -FS before meals and at bedtime  -Sliding scale before meals and bedtime   -Endocrine consult appreciated

## 2017-09-28 NOTE — DIETITIAN INITIAL EVALUATION ADULT. - NS AS NUTRI INTERV ED CONTENT
Priority modifications/Survival information/Nutrition relationship to health/disease/Recommended modifications/Purpose of the nutrition education

## 2017-09-28 NOTE — PROGRESS NOTE ADULT - SUBJECTIVE AND OBJECTIVE BOX
Chief Complaint: type 2 diabetes     Interval history: Pt feeling nauseated this AM, regurgitated once.  Appetite therefore not robust.  Sugars much improved overnight to low 100s this morning.     MEDICATIONS  (STANDING):  cefTRIAXone   IVPB 1 Gram(s) IV Intermittent every 24 hours  heparin  Injectable 5000 Unit(s) SubCutaneous every 8 hours  sodium chloride 0.45%. 1000 milliLiter(s) (75 mL/Hr) IV Continuous <Continuous>  insulin lispro (HumaLOG) corrective regimen sliding scale   SubCutaneous three times a day before meals  insulin lispro (HumaLOG) corrective regimen sliding scale   SubCutaneous at bedtime  dextrose 5%. 1000 milliLiter(s) (50 mL/Hr) IV Continuous <Continuous>  dextrose 50% Injectable 12.5 Gram(s) IV Push once  dextrose 50% Injectable 25 Gram(s) IV Push once  dextrose 50% Injectable 25 Gram(s) IV Push once  senna 2 Tablet(s) Oral at bedtime  docusate sodium 100 milliGRAM(s) Oral three times a day  polyethylene glycol 3350 17 Gram(s) Oral two times a day  insulin lispro Injectable (HumaLOG) 5 Unit(s) SubCutaneous three times a day before meals  insulin glargine Injectable (LANTUS) 15 Unit(s) SubCutaneous <User Schedule>    MEDICATIONS  (PRN):  ondansetron Injectable 4 milliGRAM(s) IV Push every 6 hours PRN Nausea  dextrose Gel 1 Dose(s) Oral once PRN Blood Glucose LESS THAN 70 milliGRAM(s)/deciliter  glucagon  Injectable 1 milliGRAM(s) IntraMuscular once PRN Glucose LESS THAN 70 milligrams/deciliter  aluminum hydroxide/magnesium hydroxide/simethicone Suspension 30 milliLiter(s) Oral every 6 hours PRN Dyspepsia      Allergies    No Known Allergies    Intolerances      Review of Systems:  Skin: no rash  Endocrine: no polyuria, polydipsia    ALL OTHER SYSTEMS REVIEWED AND NEGATIVE    PHYSICAL EXAM:  VITALS: T(C): 37.1 (09-27-17 @ 21:57)  T(F): 98.7 (09-27-17 @ 21:57), Max: 98.7 (09-27-17 @ 21:57)  HR: 72 (09-28-17 @ 05:54) (71 - 84)  BP: 153/79 (09-28-17 @ 05:54) (125/70 - 156/80)  RR:  (16 - 18)  SpO2:  (99% - 100%)  Wt(kg): --  GENERAL: NAD, well-developed  EYES: No proptosis, sclera anicteric  HEENT:  Atraumatic, Normocephalic, moist mucous membranes  SKIN: Dry, intact, No diffuse rashes   PSYCH: Alert and oriented x 3, normal affect, normal mood    CAPILLARY BLOOD GLUCOSE  137 (09-28 @ 08:36)  331 (09-27 @ 21:57)  226 (09-27 @ 17:26)  405 (09-27 @ 12:06)  323 (09-27 @ 08:24)  137 (09-27 @ 02:49)  276 (09-26 @ 21:59)  294 (09-26 @ 19:50)  174 (09-26 @ 19:07)  235 (09-26 @ 18:00)  198 (09-26 @ 17:05)  188 (09-26 @ 16:10)  238 (09-26 @ 15:05)  299 (09-26 @ 14:06)  392 (09-26 @ 13:00)  495 (09-26 @ 12:00)  596 (09-26 @ 11:02)      09-28    138  |  102  |  11  ----------------------------<  147<H>  3.7   |  26  |  0.99    EGFR if : 65  EGFR if non : 56    Ca    8.2<L>      09-28  Mg     1.7     09-28  Phos  2.2     09-28    TPro  6.7  /  Alb  3.5  /  TBili  0.2  /  DBili  x   /  AST  14  /  ALT  14  /  AlkPhos  82  09-26          Thyroid Function Tests:      Hemoglobin A1C, Whole Blood: 15.8 % <H> [4.0 - 5.6] (09-26-17 @ 15:41)

## 2017-09-28 NOTE — PROGRESS NOTE ADULT - SUBJECTIVE AND OBJECTIVE BOX
Patient is a 77y old  Female who presents with a chief complaint of low blood sugar (26 Sep 2017 20:00)        SUBJECTIVE / OVERNIGHT EVENTS:PT still reports feeling nauseous despite zofran therapy. She reports she cannot eat well because she has difficulty swallowing       MEDICATIONS  (STANDING):  cefTRIAXone   IVPB 1 Gram(s) IV Intermittent every 24 hours  heparin  Injectable 5000 Unit(s) SubCutaneous every 8 hours  sodium chloride 0.45%. 1000 milliLiter(s) (75 mL/Hr) IV Continuous <Continuous>  insulin lispro (HumaLOG) corrective regimen sliding scale   SubCutaneous three times a day before meals  insulin lispro (HumaLOG) corrective regimen sliding scale   SubCutaneous at bedtime  dextrose 5%. 1000 milliLiter(s) (50 mL/Hr) IV Continuous <Continuous>  dextrose 50% Injectable 12.5 Gram(s) IV Push once  dextrose 50% Injectable 25 Gram(s) IV Push once  dextrose 50% Injectable 25 Gram(s) IV Push once  senna 2 Tablet(s) Oral at bedtime  docusate sodium 100 milliGRAM(s) Oral three times a day  polyethylene glycol 3350 17 Gram(s) Oral two times a day  insulin lispro Injectable (HumaLOG) 5 Unit(s) SubCutaneous three times a day before meals  insulin glargine Injectable (LANTUS) 15 Unit(s) SubCutaneous <User Schedule>  potassium phosphate IVPB 15 milliMole(s) IV Intermittent once    MEDICATIONS  (PRN):  ondansetron Injectable 4 milliGRAM(s) IV Push every 6 hours PRN Nausea  dextrose Gel 1 Dose(s) Oral once PRN Blood Glucose LESS THAN 70 milliGRAM(s)/deciliter  glucagon  Injectable 1 milliGRAM(s) IntraMuscular once PRN Glucose LESS THAN 70 milligrams/deciliter        CAPILLARY BLOOD GLUCOSE  137 (28 Sep 2017 08:36)  331 (27 Sep 2017 21:57)  226 (27 Sep 2017 17:26)  405 (27 Sep 2017 12:06)        I&O's Summary    27 Sep 2017 07:01  -  28 Sep 2017 07:00  --------------------------------------------------------  IN: 1200 mL / OUT: 0 mL / NET: 1200 mL        PHYSICAL EXAM  GENERAL: NAD, well-developed  HEAD:  Atraumatic, Normocephalic  EYES: EOMI, PERRLA, conjunctiva and sclera clear  NECK: Supple, No JVD  CHEST/LUNG: Clear to auscultation bilaterally; No wheeze  HEART: Regular rate and rhythm; No murmurs, rubs, or gallops  ABDOMEN: Soft, epigastric tenderness, Nondistended; Bowel sounds present  EXTREMITIES:  2+ Peripheral Pulses, No clubbing, cyanosis, or edema  PSYCH: AAOx3  SKIN: No rashes or lesions    LABS:                        11.0   3.87  )-----------( 159      ( 28 Sep 2017 07:07 )             33.7     -28    138  |  102  |  11  ----------------------------<  147<H>  3.7   |  26  |  0.99    Ca    8.2<L>      28 Sep 2017 07:07  Phos  2.2       Mg     1.7     -    TPro  6.7  /  Alb  3.5  /  TBili  0.2  /  DBili  x   /  AST  14  /  ALT  14  /  AlkPhos  82  09-    PT/INR - ( 26 Sep 2017 10:00 )   PT: 10.7 SEC;   INR: 0.96          PTT - ( 26 Sep 2017 10:00 )  PTT:22.8 SEC  CARDIAC MARKERS ( 26 Sep 2017 15:41 )  x     / < 0.06 ng/mL / 37 u/L / 1.42 ng/mL / x      CARDIAC MARKERS ( 26 Sep 2017 10:08 )  x     / < 0.06 ng/mL / 38 u/L / 1.52 ng/mL / x      CARDIAC MARKERS ( 26 Sep 2017 10:00 )  x     / < 0.06 ng/mL / 38 u/L / 1.00 ng/mL / x          Urinalysis Basic - ( 26 Sep 2017 10:00 )    Color: PLYEL / Appearance: CLEAR / S.032 / pH: 6.0  Gluc: >1000 / Ketone: MODERATE  / Bili: NEGATIVE / Urobili: NORMAL E.U.   Blood: NEGATIVE / Protein: NEGATIVE / Nitrite: NEGATIVE   Leuk Esterase: LARGE / RBC: 25-50 / WBC >50   Sq Epi: OCC / Non Sq Epi: x / Bacteria: FEW        RADIOLOGY & ADDITIONAL TESTS:    Imaging Personally Reviewed:  Consultant(s) Notes Reviewed: Endocrine  Care Discussed with Consultants/Other Providers:

## 2017-09-28 NOTE — DIETITIAN INITIAL EVALUATION ADULT. - NS AS NUTRI DX BIOCHEMICAL
HbA1c, Glucose, Fingersticks (elevate values)/Altered nutrition - related laboratory values (specify)

## 2017-09-28 NOTE — PROGRESS NOTE ADULT - PROBLEM SELECTOR PLAN 2
Resolved.  Anion gap closed, bicarb normal.  Pt not eating much notably which can be another stimulator to ketosis.

## 2017-09-28 NOTE — DIETITIAN INITIAL EVALUATION ADULT. - PROBLEM SELECTOR PLAN 1
Labs revealed elevated anion gap, elevated ketones (beta-hydroxybutyrate), but normal pH values. Due to the absence of acidosis, pt not in DKA and likely in hyperosmolar state. s/p Lantus 16  -Recheck BMP @10pm to monitor AG  -FS q4  -Sliding scale   -Endocrine consult in the AM  -IF AG worsens will

## 2017-09-28 NOTE — DIETITIAN INITIAL EVALUATION ADULT. - PROBLEM SELECTOR PLAN 2
Likely 2/2 dehydration from poor PO intake and elevated glucose. Free water deficit over 2.2L  -Will start 1/2 NS @ 75 cc hour  -Monitor I/Os   -Recheck BMP @10pm

## 2017-09-28 NOTE — PROGRESS NOTE ADULT - SUBJECTIVE AND OBJECTIVE BOX
BHAKTI HYDE  77y  Female      Patient is a 77y old  Female who presents with a chief complaint of low blood sugar (26 Sep 2017 20:00)      INTERVAL HPI/OVERNIGHT EVENTS:    No acute events overnight. Pt complains of "something stuck in throat." Still endorses epigastric/stomach discomfort. Endorses nausea and desire to vomit, but has not overnight. No fever, chills, CP, SOB.     REVIEW OF SYSTEMS:  CONSTITUTIONAL: No fever, weight loss, or fatigue  EYES: No eye pain, visual disturbances, or discharge  ENMT:  No difficulty hearing, tinnitus, vertigo; No sinus or throat pain  NECK: No pain or stiffness  RESPIRATORY: No cough, wheezing, chills or hemoptysis; No shortness of breath  CARDIOVASCULAR: No chest pain, palpitations, dizziness, or leg swelling  GASTROINTESTINAL: + abdominal or epigastric pain. +constipation. +nausea. No diarrhea, vomiting, or hematemesis. No melena or hematochezia.  GENITOURINARY: No dysuria, frequency, hematuria, or incontinence  NEUROLOGICAL: No headaches, memory loss, loss of strength, numbness, or tremors  MUSCULOSKELETAL: No joint pain or swelling; No muscle, back, or extremity pain  SKIN: No itching, burning, rashes, or lesions   LYMPH NODES: No enlarged glands  ENDOCRINE: No heat or cold intolerance; No hair loss  PSYCHIATRIC: No depression, anxiety, mood swings, or difficulty sleeping  HEME/LYMPH: No easy bruising, or bleeding gums  ALLERY AND IMMUNOLOGIC: No hives or eczema    T(C): 37.1 (17 @ 21:57), Max: 37.1 (17 @ 21:57)  HR: 72 (17 @ 05:54) (71 - 84)  BP: 153/79 (17 @ 05:54) (125/70 - 156/80)  RR: 16 (17 @ 05:54) (16 - 18)  SpO2: 100% (17 @ 05:54) (99% - 100%)  Wt(kg): --Vital Signs Last 24 Hrs  T(C): 37.1 (27 Sep 2017 21:57), Max: 37.1 (27 Sep 2017 21:57)  T(F): 98.7 (27 Sep 2017 21:57), Max: 98.7 (27 Sep 2017 21:57)  HR: 72 (28 Sep 2017 05:54) (71 - 84)  BP: 153/79 (28 Sep 2017 05:54) (125/70 - 156/80)  BP(mean): --  RR: 16 (28 Sep 2017 05:54) (16 - 18)  SpO2: 100% (28 Sep 2017 05:54) (99% - 100%)  Wt(kg): --    PHYSICAL EXAM:  GENERAL: NAD, well-groomed, well-developed  HEAD:  Atraumatic, Normocephalic  EYES: EOMI, PERRLA, conjunctiva and sclera clear  ENMT: Moist mucous membranes, Good dentition, No lesions  NECK: Supple, No JVD, Normal thyroid   NERVOUS SYSTEM:  Alert & Oriented X3, Good concentration; Motor Strength 5/5 B/L upper and lower extremities; DTRs 2+ intact and symmetric  CHEST/LUNG: Clear to auscultation bilaterally; No rales, rhonchi, wheezing, or rubs  HEART: Regular rate and rhythm; No murmurs, rubs, or gallops  ABDOMEN: Soft, not tender to palpation despite abdominal discomfort, Nondistended; Bowel sounds present  EXTREMITIES:  2+ Peripheral Pulses, No clubbing, cyanosis, or edema  LYMPH: No lymphadenopathy noted  SKIN: No rashes or lesions    Consultant(s) Notes Reviewed:  [x ] YES  [ ] NO  Care Discussed with Consultants/Other Providers [ x] YES  [ ] NO    LABS:                        11.0   3.87  )-----------( 159      ( 28 Sep 2017 07:07 )             33.7     -    138  |  102  |  11  ----------------------------<  147<H>  3.7   |  26  |  0.99    Ca    8.2<L>      28 Sep 2017 07:07  Phos  2.2     -  Mg     1.7     -    TPro  6.7  /  Alb  3.5  /  TBili  0.2  /  DBili  x   /  AST  14  /  ALT  14  /  AlkPhos  82  -    PT/INR - ( 26 Sep 2017 10:00 )   PT: 10.7 SEC;   INR: 0.96          PTT - ( 26 Sep 2017 10:00 )  PTT:22.8 SEC  Urinalysis Basic - ( 26 Sep 2017 10:00 )    Color: PLYEL / Appearance: CLEAR / S.032 / pH: 6.0  Gluc: >1000 / Ketone: MODERATE  / Bili: NEGATIVE / Urobili: NORMAL E.U.   Blood: NEGATIVE / Protein: NEGATIVE / Nitrite: NEGATIVE   Leuk Esterase: LARGE / RBC: 25-50 / WBC >50   Sq Epi: OCC / Non Sq Epi: x / Bacteria: FEW      CAPILLARY BLOOD GLUCOSE  137 (28 Sep 2017 08:36)  331 (27 Sep 2017 21:57)  226 (27 Sep 2017 17:26)  405 (27 Sep 2017 12:06)            Urinalysis Basic - ( 26 Sep 2017 10:00 )    Color: PLYEL / Appearance: CLEAR / S.032 / pH: 6.0  Gluc: >1000 / Ketone: MODERATE  / Bili: NEGATIVE / Urobili: NORMAL E.U.   Blood: NEGATIVE / Protein: NEGATIVE / Nitrite: NEGATIVE   Leuk Esterase: LARGE / RBC: 25-50 / WBC >50   Sq Epi: OCC / Non Sq Epi: x / Bacteria: FEW

## 2017-09-28 NOTE — DIETITIAN INITIAL EVALUATION ADULT. - PROBLEM SELECTOR PLAN 3
Nelia prerenal in setting of dehydration. Unclear patients baseline but improving  -Will obtain outpatient labs to detemrine what is pts baseline renal function  -Hold nephrotoxins

## 2017-09-28 NOTE — DIETITIAN INITIAL EVALUATION ADULT. - OTHER INFO
Pt. observed w fair PO intake of breakfast (~50% consumption).  Drinks Ensure supplement 1-2x/day at home (Glucerna suggested given Hx of DM).  Pt. denies food allergies, nausea/vomiting/diarrhea/constipation at this time, or issues with chewing.  Feels as if food is "gets stuck in throat".... swallow evaluation pending.  Pt. w no prior knowledge of suggested therapeutic diet as it relates to her DM or HTN.  Provided extensive verbal diet education including carbohydrate and fiber food sources, importance of consistent carb. intake, as well as low sodium restriction.  Pt. does report adding salt to food.  She consumes orange juice and coconut water... discouraged intake of concentrated sweetened beverages and rather encouraged intake of water.  Pt. also does not self monitor blood glucose, which was encouraged.  Informed/explained HbA1c & findings.  Also advised on hypoglycemia prevention/management and suggested outpatient endocrinology f/u.  Pt. asks questions.  Advised RDN remains available.

## 2017-09-28 NOTE — PROGRESS NOTE ADULT - PROBLEM SELECTOR PLAN 2
Pt reports dysphagia to solids but not liquids. Barium swallow neg for structural disease  -Speech and swallow for today  -Maalox Dyspepsia  -Will consider GI consult if symptoms persist

## 2017-09-28 NOTE — PROGRESS NOTE ADULT - PROBLEM SELECTOR PLAN 1
For now, will continue weight based 15 units glargine qHS and 5 units humalog tidac + SATISH.  Pt may need minor adjustements going forward. Also should have humalog HELD if not eating.      On discharge, will need MDI with lantus/humalog.  Her daughter lives with her and is a medical professional and knows how to inject insulin.  Pt would also benefit from nursing instruction while here as to how to inject as well.

## 2017-09-28 NOTE — PROGRESS NOTE ADULT - PROBLEM SELECTOR PLAN 3
Positive UA with lower abdominal pain symptoms. Pt reports lower abdominal pain improved.    -Ceftriaxone 1g qD. Day 3. Will d/c antibiotics  -F/u urine culture Positive UA with lower abdominal pain symptoms. Pt reports lower abdominal pain improved.  Urine culture showed multiple organisms.   -Ceftriaxone 1g qD. Day 3. Will d/c antibiotics

## 2017-09-28 NOTE — PROGRESS NOTE ADULT - PROBLEM SELECTOR PLAN 3
Positive UA with lower abdominal pain symptoms. Pt reports lower abdominal pain improved.    -Ceftriaxone 1g qD. Day 3. Will d/c antibiotics  -F/u urine culture

## 2017-09-28 NOTE — DIETITIAN INITIAL EVALUATION ADULT. - ETIOLOGY
Lack of prior diet/nutrition related education. Decreased PO intake attributed [by Pt] to swallowing difficulty.

## 2017-09-28 NOTE — DIETITIAN INITIAL EVALUATION ADULT. - SIGNS/SYMPTOMS
Pt. unable to teach back prior understanding of therapeutic diet.  HbA1c 15.8. Pt. reports 18lb decrease.

## 2017-09-28 NOTE — PROGRESS NOTE ADULT - PROBLEM SELECTOR PLAN 2
Pt reports dysphagia to solids but not liquids. Barium swallow neg for structural disease  -Speech and swallow today  -Maalox Dyspepsia  -Will consider GI consult if symptoms persist

## 2017-09-28 NOTE — PROGRESS NOTE ADULT - PROBLEM SELECTOR PLAN 5
Likely 2/2 dehydration. PT has not had BM as of yet   -c/w senna colace miralax  -Will add enema today

## 2017-09-28 NOTE — DIETITIAN INITIAL EVALUATION ADULT. - NUTRITION INTERVENTION
Nutrition Education/Meals and Snack Collaboration and Referral of Nutrition Care Medical Food Supplements/Collaboration and Referral of Nutrition Care

## 2017-09-29 VITALS
TEMPERATURE: 99 F | RESPIRATION RATE: 17 BRPM | SYSTOLIC BLOOD PRESSURE: 126 MMHG | OXYGEN SATURATION: 99 % | DIASTOLIC BLOOD PRESSURE: 69 MMHG | HEART RATE: 67 BPM

## 2017-09-29 PROCEDURE — 99232 SBSQ HOSP IP/OBS MODERATE 35: CPT

## 2017-09-29 PROCEDURE — 99239 HOSP IP/OBS DSCHRG MGMT >30: CPT

## 2017-09-29 RX ORDER — AMLODIPINE BESYLATE 2.5 MG/1
1 TABLET ORAL
Qty: 0 | Refills: 0 | COMMUNITY

## 2017-09-29 RX ORDER — ENOXAPARIN SODIUM 100 MG/ML
15 INJECTION SUBCUTANEOUS
Qty: 15 | Refills: 0 | OUTPATIENT
Start: 2017-09-29

## 2017-09-29 RX ORDER — PANTOPRAZOLE SODIUM 20 MG/1
1 TABLET, DELAYED RELEASE ORAL
Qty: 30 | Refills: 0 | OUTPATIENT
Start: 2017-09-29 | End: 2017-10-29

## 2017-09-29 RX ORDER — AMLODIPINE BESYLATE 2.5 MG/1
1 TABLET ORAL
Qty: 30 | Refills: 0 | OUTPATIENT
Start: 2017-09-29 | End: 2017-10-29

## 2017-09-29 RX ORDER — METFORMIN HYDROCHLORIDE 850 MG/1
1 TABLET ORAL
Qty: 0 | Refills: 0 | COMMUNITY

## 2017-09-29 RX ORDER — SIMVASTATIN 20 MG/1
1 TABLET, FILM COATED ORAL
Qty: 30 | Refills: 0 | OUTPATIENT
Start: 2017-09-29 | End: 2017-10-29

## 2017-09-29 RX ORDER — SIMVASTATIN 20 MG/1
1 TABLET, FILM COATED ORAL
Qty: 0 | Refills: 0 | COMMUNITY

## 2017-09-29 RX ORDER — INSULIN LISPRO 100/ML
5 VIAL (ML) SUBCUTANEOUS
Qty: 15 | Refills: 0 | OUTPATIENT
Start: 2017-09-29

## 2017-09-29 RX ADMIN — HEPARIN SODIUM 5000 UNIT(S): 5000 INJECTION INTRAVENOUS; SUBCUTANEOUS at 05:45

## 2017-09-29 RX ADMIN — SODIUM CHLORIDE 75 MILLILITER(S): 9 INJECTION, SOLUTION INTRAVENOUS at 02:02

## 2017-09-29 RX ADMIN — Medication 81 MILLIGRAM(S): at 13:22

## 2017-09-29 RX ADMIN — SODIUM CHLORIDE 75 MILLILITER(S): 9 INJECTION, SOLUTION INTRAVENOUS at 08:34

## 2017-09-29 RX ADMIN — Medication 5 UNIT(S): at 12:31

## 2017-09-29 RX ADMIN — Medication 1 GRAM(S): at 05:44

## 2017-09-29 RX ADMIN — Medication 100 MILLIGRAM(S): at 05:44

## 2017-09-29 RX ADMIN — Medication 1 GRAM(S): at 13:23

## 2017-09-29 RX ADMIN — PANTOPRAZOLE SODIUM 40 MILLIGRAM(S): 20 TABLET, DELAYED RELEASE ORAL at 05:44

## 2017-09-29 RX ADMIN — AMLODIPINE BESYLATE 5 MILLIGRAM(S): 2.5 TABLET ORAL at 05:44

## 2017-09-29 RX ADMIN — Medication 100 MILLIGRAM(S): at 13:23

## 2017-09-29 RX ADMIN — Medication 3: at 12:30

## 2017-09-29 RX ADMIN — HEPARIN SODIUM 5000 UNIT(S): 5000 INJECTION INTRAVENOUS; SUBCUTANEOUS at 13:23

## 2017-09-29 RX ADMIN — Medication 5 UNIT(S): at 08:34

## 2017-09-29 RX ADMIN — POLYETHYLENE GLYCOL 3350 17 GRAM(S): 17 POWDER, FOR SOLUTION ORAL at 05:45

## 2017-09-29 NOTE — SWALLOW BEDSIDE ASSESSMENT ADULT - SWALLOW EVAL: DIAGNOSIS
Functional oral and pharyngeal stages of swallow characterized by adequate oral acceptance and containment, timely bolus collection and transport, timely pharyngeal trigger with hyolaryngeal excursion appreciated upon palpation, and no overt s/s of impaired airway protection noted across all trials/consistencies.

## 2017-09-29 NOTE — DISCHARGE NOTE ADULT - PROVIDER TOKENS
FREE:[LAST:[Endocrine],PHONE:[phone]],FAX:[(   )    -]],FREE:[LAST:[Dmitri],FIRST:[Acosta],PHONE:[phone]],FAX:[(   )    -]]

## 2017-09-29 NOTE — DISCHARGE NOTE ADULT - CARE PROVIDER_API CALL
Endocrine,   Phone: [phone]  Fax: (   )    -    Acosta Rizvi  Phone: [phone]  Fax: (   )    -

## 2017-09-29 NOTE — PROGRESS NOTE ADULT - PROBLEM SELECTOR PLAN 1
AM FS pending; FS yesterday 137-201 with only 3 U slide.  - Will f/u AM FS; no adjustment to insulin regimen at this time  -FS before meals and at bedtime  -Sliding scale before meals and bedtime   -Endocrine consult appreciated AM FS pending; FS yesterday 137-201 with only 3 U slide.  - AM insulin 149; no adjustment to insulin regimen at this time  - Diabetes education today  -Endocrine consult appreciated

## 2017-09-29 NOTE — DISCHARGE NOTE ADULT - HOSPITAL COURSE
76 yo F w PMHx of HTN, DM on metformin, p/w nausea, vomiting, lethargy, and lower abdominal pain. In the ED, vital signs were: T 37.4C, /89, /min, RR 20/min, Sp02 100% on room air. Labs revealed: elevated anion gap, elevated ketones (beta-hydroxybutyrate), but normal pH values. Patient given 3.5L NS in the ED, 1L D5,1/2NS, and insulin gtt. With fluids and insulin, pt improving with normal anion gap and glucose in the 200s. EKG revealed t wave inversions in the inferior leads (II, III, avF), but no cardiac enzymes were present. 78 yo F w PMHx of HTN, DM on metformin, p/w nausea, vomiting, lethargy, and lower abdominal pain. In the ED, vital signs were: T 37.4C, /89, /min, RR 20/min, Sp02 100% on room air. Labs revealed: elevated anion gap, elevated ketones (beta-hydroxybutyrate), but normal pH values. Patient given 3.5L NS in the ED, 1L D5,1/2NS, and insulin gtt. With fluids and insulin, pt improving with normal anion gap and glucose in the 200s. EKG revealed t wave inversions in the inferior leads (II, III, avF), but no cardiac enzymes were present. On the floor her insulin regimen was adjusted and her sugars were controlled with 15 u lantus qHS, 5 u humalog TID with meals. Her UA was grossly positive, although urine cultures grew >3 organisms and were felt to be contaminated, for which she received three days of ceftriaxone. She received diabetes education and was discharged home on her in patient insulin regimen. 78 yo F w PMHx of HTN, DM on metformin, p/w nausea, vomiting, lethargy, and lower abdominal pain. In the ED, vital signs were: T 37.4C, /89, /min, RR 20/min, Sp02 100% on room air. Labs revealed: elevated anion gap, elevated ketones (beta-hydroxybutyrate), but normal pH values. Patient given 3.5L NS in the ED, 1L D5,1/2NS, and insulin gtt. With fluids and insulin, pt improving with normal anion gap and glucose in the 200s. EKG revealed t wave inversions in the inferior leads (II, III, avF), but no cardiac enzymes were present. On the floor her insulin regimen was adjusted and her sugars were controlled with 15 u lantus qHS, 5 u humalog TID with meals. Her UA was grossly positive, although urine cultures grew >3 organisms and were felt to be contaminated, for which she received three days of ceftriaxone.Pt also c/o  Intermittent dysphagia x 6 months with symptoms of acid reflux-Barium swallow showing tertiary contractions The esophagus demonstrates normal contour and course. There is no  abnormal extrinsic mass effect. Pt was started on  Protonix as pt reports burning sympotms.pt advised to FU PMD for oupt evaluation of dysphagia.Team contacted pts PMD and obtained appointment with  Dr. Acosta Rizvi on Thursday at 10/4 at 11am.She received diabetes education and was discharged home on her in patient insulin regimen.

## 2017-09-29 NOTE — PROGRESS NOTE ADULT - SUBJECTIVE AND OBJECTIVE BOX
Chief Complaint: DM 2    History: Patient denies complaints at this time. Tolerating PO intake. Daughter at bedside, working with RN to validate her ability to administer insulin to her mom.     MEDICATIONS  (STANDING):  heparin  Injectable 5000 Unit(s) SubCutaneous every 8 hours  sodium chloride 0.45%. 1000 milliLiter(s) (75 mL/Hr) IV Continuous <Continuous>  insulin lispro (HumaLOG) corrective regimen sliding scale   SubCutaneous three times a day before meals  insulin lispro (HumaLOG) corrective regimen sliding scale   SubCutaneous at bedtime  dextrose 5%. 1000 milliLiter(s) (50 mL/Hr) IV Continuous <Continuous>  dextrose 50% Injectable 12.5 Gram(s) IV Push once  dextrose 50% Injectable 25 Gram(s) IV Push once  dextrose 50% Injectable 25 Gram(s) IV Push once  senna 2 Tablet(s) Oral at bedtime  docusate sodium 100 milliGRAM(s) Oral three times a day  polyethylene glycol 3350 17 Gram(s) Oral two times a day  insulin lispro Injectable (HumaLOG) 5 Unit(s) SubCutaneous three times a day before meals  insulin glargine Injectable (LANTUS) 15 Unit(s) SubCutaneous <User Schedule>  aspirin enteric coated 81 milliGRAM(s) Oral daily  simvastatin 40 milliGRAM(s) Oral at bedtime  amLODIPine   Tablet 5 milliGRAM(s) Oral daily  pantoprazole    Tablet 40 milliGRAM(s) Oral before breakfast  sucralfate 1 Gram(s) Oral three times a day    MEDICATIONS  (PRN):  ondansetron Injectable 4 milliGRAM(s) IV Push every 6 hours PRN Nausea  dextrose Gel 1 Dose(s) Oral once PRN Blood Glucose LESS THAN 70 milliGRAM(s)/deciliter  glucagon  Injectable 1 milliGRAM(s) IntraMuscular once PRN Glucose LESS THAN 70 milligrams/deciliter  aluminum hydroxide/magnesium hydroxide/simethicone Suspension 30 milliLiter(s) Oral every 6 hours PRN Dyspepsia      No Known Allergies      Review of Systems:  Constitutional: No fever  Eyes: No blurry vision  Neuro: No tremors  HEENT: No pain  Cardiovascular: No chest pain, palpitations  Respiratory: No SOB, no cough  GI: No nausea, vomiting, abdominal pain  : No dysuria  Skin: no rash  Psych: no depression  Endocrine: no polyuria, polydipsia        PHYSICAL EXAM:  VITALS: T(C): 37 (09-29-17 @ 05:26)  T(F): 98.6 (09-29-17 @ 05:26), Max: 98.8 (09-28-17 @ 21:22)  HR: 62 (09-29-17 @ 05:26) (62 - 68)  BP: 150/81 (09-29-17 @ 05:26) (124/69 - 150/81)  RR:  (16 - 18)  SpO2:  (99% - 100%)  Wt(kg): --  GENERAL: NAD, well-groomed  EYES: No proptosis, no lid lag, anicteric  HEENT:  Atraumatic, Normocephalic, moist mucous membranes  GI: Soft, nontender, non distended, normal bowel sounds  SKIN: Warm/Dry  PSYCH: Alert and oriented x 3, normal affect, normal mood      CAPILLARY BLOOD GLUCOSE  149 (09-29 @ 08:21)  155 (09-28 @ 21:22)  181 (09-28 @ 17:14)  201 (09-28 @ 12:16)  137 (09-28 @ 08:36)  331 (09-27 @ 21:57)  226 (09-27 @ 17:26)  405 (09-27 @ 12:06)  323 (09-27 @ 08:24)  137 (09-27 @ 02:49)  276 (09-26 @ 21:59)  294 (09-26 @ 19:50)  174 (09-26 @ 19:07)  235 (09-26 @ 18:00)  198 (09-26 @ 17:05)  188 (09-26 @ 16:10)  238 (09-26 @ 15:05)  299 (09-26 @ 14:06)  392 (09-26 @ 13:00)  495 (09-26 @ 12:00)      09-28    138  |  102  |  11  ----------------------------<  147<H>  3.7   |  26  |  0.99    EGFR if : 65  EGFR if non : 56    Ca    8.2<L>      09-28  Mg     1.7     09-28  Phos  2.2     09-28    TPro  6.7  /  Alb  3.5  /  TBili  0.2  /  DBili  x   /  AST  14  /  ALT  14  /  AlkPhos  82  09-26          Thyroid Function Tests:      Hemoglobin A1C, Whole Blood: 15.8 % <H> [4.0 - 5.6] (09-26-17 @ 15:41)    Advise Patient on Discharge note:  - Hypoglycemia Management : Please check your fingersticks every morning or if you are not feeling well.  If your fingerstick is >300 mg/dl x 3 or more readings, please contact your PCP or Dr Kurtz. If your fingerstick low, <70 mg/dl and/or you have symptoms of very low blood sugar, FIRST drink 1/2 cup of apple juice, (or take 4 glucose tabs/tube of glucose gel) and recheck fingerstick in 15 minutes.  Repeat these steps until blood sugar is above 100 mg/dl, IF NECESSARY.  Then call provider listed above to discuss low blood sugar at     -What to expect at follow appointment:  Please bring a log of your fingersticks and/or your glucometer to your appointment.  Your blod [employer] tracking will help your diabetes team determine the best plan.    Insulin regimen:  Lantus 15 units QHS (or insurance preferred basal insulin)  Humalog 5 units TID before meals (or insurance preferred rapid acting insulin. OK to substitute with Novolog)    Additional Discharge recs: OTC glucose tablets, take as directed for hypoglycemia     Diabetes supplies to be orderd via E-prescribe:      - Alcohol pads  - Glucometer as per insurance  - Test strips  4 x /day  - Lancets      4 x /day  - BD roshan needles inject 4 times daily     Discharge Appointments:  Daughter is calling Endocrine faculty practice at  to see if patient's insurance is accepted there. If not, patient reports having an appointment scheduled with her PCP and needs to call insurance company for an Endocrine provider who accepts her plan.

## 2017-09-29 NOTE — PROGRESS NOTE ADULT - PROBLEM SELECTOR PROBLEM 1
Hyperglycemia
Type 2 diabetes mellitus with hyperglycemia, with long-term current use of insulin
Type 2 diabetes mellitus with hyperglycemia, with long-term current use of insulin
Hyperglycemia
Hyperglycemia

## 2017-09-29 NOTE — PROGRESS NOTE ADULT - ATTENDING COMMENTS
Pt seen and examined by me on 9/27.  Agree with resident  1) Uncontrolled DM with resolving HHNK with ketosis s/p Insulin gtt. Gap now closed (ap 10) On Basal/bolus regimen. Will uptitrate as per BS  Pt used to check BS at home and will need diabetic teaching/Insulin teaching  Endo on board   2) Intermittent dysphagia x 6 months with symptoms of acid reflux-Barium swallow showing tertiary contractions The esophagus demonstrates normal contour and course. There is no  abnormal extrinsic mass effect.  Will start Protonix/Sucralfate as pt reports burning sympotms.Await Speech/Swallow.  3) Hypophosphatemia- replete  4) DVT prox- HSQ .
Pt seen and examined by me on 9/27.  Agree with resident.Pt feels very well. Anxious to go home. Denies any swalloing problem in last 2 days.   1) Uncontrolled DM with resolving HHNK with ketosis s/p Insulin gtt. Gap now closed (ap 10) On Basal/bolus regimen. Will uptitrate as per BS  Diabetic education on board- educator to meet with daughter   2) Intermittent dysphagia x 6 months with symptoms of acid reflux-Barium swallow showing tertiary contractions The esophagus demonstrates normal contour and course. There is no  abnormal extrinsic mass effect.  Will start Protonix/Sucralfate as pt reports burning sympotms.pt advised to FU PMD for oupt evaluation of dysphagia.Team contacted pts PMD and obtained appointment with  Dr. Acosta Rizvi on Thursday at 10/4 at 11am.  3) Hypophosphatemia- replete  4) DVT prox- HSQ   5) DC planning 40 mins
Pt seen and examined by me on 9/27.  Agree with resident  1) Uncontrolled DM with resolving HHNK with ketosis s/p Insulin gtt. Gap now closed. On Basal/bolus regimen. Will uptitrate as per BS  Pt will need diabetic teaching/Insulin teaching  Endo consult called- [facility] Endo NP  2) Likely Acid reflux with intermittent dysphagia x 6 months- Will check Barium swallow  3) DVT prox- HSQ

## 2017-09-29 NOTE — DISCHARGE NOTE ADULT - MEDICATION SUMMARY - MEDICATIONS TO TAKE
I will START or STAY ON the medications listed below when I get home from the hospital:    BD Nanopen Mcclusky  -- 4 mm use as directed 4 times daily. Dispense 3 boxes.   -- Indication: For Diabetes    Alcohol Swab Pads  -- Use as directed. Dispense 3 boxes  -- Indication: For Diabetes    Glucagon Emergency Kit  -- Use as directed. Dispense 2.  -- Indication: For Diabetes    Lancets  -- Test blood sugar 4 times daily. Dispense 3 boxes.  -- Indication: For Diabetes    Test Strips  -- Test blood sugar 4 times daily. Dispense 6 boxes.  -- Indication: For Diabetes    Glucometer  -- Use 4 times daily. Dispense 1 device as covered by insurance.   -- Indication: For Diabetes    aspirin 81 mg oral tablet  -- 1 tab(s) by mouth once a day  -- Indication: For Heart disease    Lantus Solostar Pen 100 units/mL subcutaneous solution  -- 15 unit(s) subcutaneous once a day (at bedtime)   -- Do not drink alcoholic beverages when taking this medication.  It is very important that you take or use this exactly as directed.  Do not skip doses or discontinue unless directed by your doctor.  Keep in refrigerator.  Do not freeze.    -- Indication: For Diabetes    HumaLOG KwikPen 100 units/mL subcutaneous solution  -- 5 unit(s) subcutaneous 3 times a day (before meals)   -- Do not drink alcoholic beverages when taking this medication.  It is very important that you take or use this exactly as directed.  Do not skip doses or discontinue unless directed by your doctor.  Keep in refrigerator.  Do not freeze.    -- Indication: For Diabetes    simvastatin 40 mg oral tablet  -- 1 tab(s) by mouth once a day (at bedtime)  -- Indication: For Cholesterol    Norvasc 5 mg oral tablet  -- 1 tab(s) by mouth once a day  -- Indication: For HTN (hypertension)    MiraLax oral powder for reconstitution  -- Indication: For Constipation    pantoprazole 40 mg oral delayed release tablet  -- 1 tab(s) by mouth once a day (before a meal)  -- Indication: For Dysphagia    multivitamin  -- Indication: For Nutrition

## 2017-09-29 NOTE — PROGRESS NOTE ADULT - PROBLEM SELECTOR PLAN 2
Pt reports dysphagia to solids but not liquids. Barium swallow neg for structural disease  - Will f/u speech and swallow recs once eval preformed  -Maalox Dyspepsia  -Will consider GI consult if symptoms persist

## 2017-09-29 NOTE — PROGRESS NOTE ADULT - ASSESSMENT
78 yo F w PMHx of HTN, DM on metformin, p/w nausea, vomiting, lethargy, and lower abdominal pain. Found to have elevated B Hydroxybutyrate with no acidosis.
76 yo F w PMHx of HTN, DM on metformin, p/w nausea, vomiting, lethargy, and lower abdominal pain. Found to have elevated B Hydroxybutyrate with no acidosis.
76 yo female with type 2 diabetes, here with HONK.  Has type 2 diabetes since 2002, on metformin only outpt and has never been on insulin.  Admitted with normal ph of 7.34 but with elevated BOHB, also poor po intake, and with elevated serum osm and sugar of 900s on BMP.  Pt was quite ill on admission and is a complex pt requiring high level decision making .
78 yo F w PMHx of HTN, DM on metformin, p/w nausea, vomiting, lethargy, and lower abdominal pain. Found to have elevated B Hydroxybutyrate with no acidosis.
78 yo female with type 2 diabetes, here with HONK.  Has type 2 diabetes since 2002, on metformin only outpt and has never been on insulin.  Admitted with normal ph of 7.34 but with elevated BOHB, also poor po intake, and with elevated serum osm and sugar of 900s on BMP.
76 yo F w PMHx of HTN, DM on metformin, p/w nausea, vomiting, lethargy, and lower abdominal pain. Labs revealed elevated anion gap, elevated ketones (beta-hydroxybutyrate), but normal pH values. General chemistry revealed hyperkalemia, hyperglycemia, hypercalcemia, and high protein. CBC revealed elevated hg/hct. These values have begun to normalize with fluids and insulin in the ED, though sodium has become elevated. Due to the absence of acidosis, pt likely suffering from severe dehydration and hyperosmolar states without altered mental status.
76 yo F w PMHx of HTN, DM on metformin, p/w nausea, vomiting, lethargy, and lower abdominal pain. Found to have elevated B Hydroxybutyrate with no acidosis.

## 2017-09-29 NOTE — PROGRESS NOTE ADULT - PROVIDER SPECIALTY LIST ADULT
Endocrinology
Endocrinology
Internal Medicine

## 2017-09-29 NOTE — DISCHARGE NOTE ADULT - MEDICATION SUMMARY - MEDICATIONS TO STOP TAKING
I will STOP taking the medications listed below when I get home from the hospital:    metFORMIN 500 mg oral tablet  -- 1 tab(s) by mouth once a day

## 2017-09-29 NOTE — DISCHARGE NOTE ADULT - CARE PLAN
Principal Discharge DX:	Type 2 diabetes mellitus with hyperglycemia, with long-term current use of insulin  Instructions for follow-up, activity and diet:	You were diagnosed with diabetes during this admission and prescribed insulin to manage your blood sugar level. Please take your insulin as instructed, measure your blood glucose four times a day, before every meal and before going to bed.   - Hypoglycemia Management : Please check your fingersticks every morning or if you are not feeling well.  If your fingerstick is >300 mg/dl x 3 or more readings, please contact your PCP or Dr Kurtz. If your fingerstick low, <70 mg/dl and/or you have symptoms of very low blood sugar, FIRST drink 1/2 cup of apple juice, (or take 4 glucose tabs/tube of glucose gel) and recheck fingerstick in 15 minutes.  Repeat these steps until blood sugar is above 100 mg/dl, IF NECESSARY.  Then call provider listed above to discuss low blood sugar at     -What to expect at follow appointment:  Please bring a log of your fingersticks and/or your glucometer to your appointment.  Your blod [employer] tracking will help your diabetes team determine the best plan.  Secondary Diagnosis:	JESSE (acute kidney injury)  Secondary Diagnosis:	Essential hypertension Principal Discharge DX:	Type 2 diabetes mellitus with hyperglycemia, with long-term current use of insulin  Goal:	Controlled blood sugars  Instructions for follow-up, activity and diet:	You were diagnosed with diabetes during this admission and prescribed insulin to manage your blood sugar level. Please take your insulin as instructed, measure your blood glucose four times a day, before every meal and before going to bed.   - Hypoglycemia Management : Please check your fingersticks every morning or if you are not feeling well.  If your fingerstick is >300 mg/dl x 3 or more readings, please contact your PCP or Dr Kurtz. If your fingerstick low, <70 mg/dl and/or you have symptoms of very low blood sugar, FIRST drink 1/2 cup of apple juice, (or take 4 glucose tabs/tube of glucose gel) and recheck fingerstick in 15 minutes.  Repeat these steps until blood sugar is above 100 mg/dl, IF NECESSARY.  Then call provider listed above to discuss low blood sugar at     -What to expect at follow appointment:  Please bring a log of your fingersticks and/or your glucometer to your appointment.  Your blod [employer] tracking will help your diabetes team determine the best plan.  Secondary Diagnosis:	JESSE (acute kidney injury)  Instructions for follow-up, activity and diet:	During this admission you were diagnosed with an acute kidney injury, this was likely due to dehydration and high blood sugars. Your kidney function improved before you were discharged. Please continue to drink plenty of water to stay hydrated once you are discharged and please monitor your blood sugar closely.  Secondary Diagnosis:	Essential hypertension  Instructions for follow-up, activity and diet:	Your blood pressure during this admission was high. You received amlodipine to control your blood pressure, please continue to take this medication as instructed once you are discharged.  Secondary Diagnosis:	UTI (urinary tract infection)  Instructions for follow-up, activity and diet:	You were diagnosed with an urinary tract infection during this admission, you received antibiotics for this infection. Please stay hydrated and take your insulin to control your blood sugar to reduce your risk of future infections. Principal Discharge DX:	Type 2 diabetes mellitus with hyperglycemia, with long-term current use of insulin  Goal:	Controlled blood sugars  Instructions for follow-up, activity and diet:	You were diagnosed with diabetes during this admission and prescribed insulin to manage your blood sugar level. Please take your insulin as instructed, measure your blood glucose four times a day, before every meal and before going to bed.   - Hypoglycemia Management : Please check your fingersticks every morning or if you are not feeling well.  If your fingerstick is >300 mg/dl x 3 or more readings, please contact your PCP or Dr Kurtz. If your fingerstick low, <70 mg/dl and/or you have symptoms of very low blood sugar, FIRST drink 1/2 cup of apple juice, (or take 4 glucose tabs/tube of glucose gel) and recheck fingerstick in 15 minutes.  Repeat these steps until blood sugar is above 100 mg/dl, IF NECESSARY.  Then call provider listed above to discuss low blood sugar at     -What to expect at follow appointment:  Please bring a log of your fingersticks and/or your glucometer to your appointment.  Your blod [employer] tracking will help your diabetes team determine the best plan.  Secondary Diagnosis:	JESSE (acute kidney injury)  Instructions for follow-up, activity and diet:	During this admission you were diagnosed with an acute kidney injury, this was likely due to dehydration and high blood sugars. Your kidney function improved before you were discharged. Please continue to drink plenty of water to stay hydrated once you are discharged and please monitor your blood sugar closely.  Secondary Diagnosis:	Essential hypertension  Instructions for follow-up, activity and diet:	Your blood pressure during this admission was high. You received amlodipine to control your blood pressure, please continue to take this medication as instructed once you are discharged.  Secondary Diagnosis:	UTI (urinary tract infection)  Instructions for follow-up, activity and diet:	You were diagnosed with an urinary tract infection during this admission, you received antibiotics for this infection. Please stay hydrated and take your insulin to control your blood sugar to reduce your risk of future infections.  Secondary Diagnosis:	Dysphagia  Instructions for follow-up, activity and diet:	During this admission you reported difficulty and pain when swallowing. Please follow up with your primary care physician to discuss this problem. You will be discharged with pantoprazole, a medication that may make swallowing less painful, continue to take this as instructed.

## 2017-09-29 NOTE — PROGRESS NOTE ADULT - PROBLEM SELECTOR PLAN 5
Likely 2/2 dehydration. PT still without BM following ducolax suppository  -c/w senna colace miralax  -Will add enema today Likely 2/2 dehydration. PT still without BM following ducolax suppository  -c/w senna colace miralax  - Ordered for tap water enema today Likely 2/2 dehydration. PT reports BM overnight  -c/w senna colace miralax

## 2017-09-29 NOTE — DISCHARGE NOTE ADULT - PLAN OF CARE
You were diagnosed with diabetes during this admission and prescribed insulin to manage your blood sugar level. Please take your insulin as instructed, measure your blood glucose four times a day, before every meal and before going to bed.   - Hypoglycemia Management : Please check your fingersticks every morning or if you are not feeling well.  If your fingerstick is >300 mg/dl x 3 or more readings, please contact your PCP or Dr Kurtz. If your fingerstick low, <70 mg/dl and/or you have symptoms of very low blood sugar, FIRST drink 1/2 cup of apple juice, (or take 4 glucose tabs/tube of glucose gel) and recheck fingerstick in 15 minutes.  Repeat these steps until blood sugar is above 100 mg/dl, IF NECESSARY.  Then call provider listed above to discuss low blood sugar at     -What to expect at follow appointment:  Please bring a log of your fingersticks and/or your glucometer to your appointment.  Your blod [employer] tracking will help your diabetes team determine the best plan. Controlled blood sugars During this admission you were diagnosed with an acute kidney injury, this was likely due to dehydration and high blood sugars. Your kidney function improved before you were discharged. Please continue to drink plenty of water to stay hydrated once you are discharged and please monitor your blood sugar closely. Your blood pressure during this admission was high. You received amlodipine to control your blood pressure, please continue to take this medication as instructed once you are discharged. You were diagnosed with an urinary tract infection during this admission, you received antibiotics for this infection. Please stay hydrated and take your insulin to control your blood sugar to reduce your risk of future infections. During this admission you reported difficulty and pain when swallowing. Please follow up with your primary care physician to discuss this problem. You will be discharged with pantoprazole, a medication that may make swallowing less painful, continue to take this as instructed.

## 2017-09-29 NOTE — SWALLOW BEDSIDE ASSESSMENT ADULT - COMMENTS
Patient is a 78 y/o female admitted with Hyperglycaemic Hyperosmolar Nonketotic Coma. PMHx of DM2 and HTN.  Patient received A+Ox3, pleasant and cooperative. Charting reveals intermittent dysphagia x6 months with symptoms of acid reflux, however patient reports this has resolved.    9/26/17 - CXR: Clear lungs  9/28/17 - Barium Swallow: The  patient  swallowed  barium  without  difficulty. The  esophagus  demonstrates  normal  contour  and  course.  There  is  no  abnormal extrinsic  mass  effect.  Tertiary  esophageal  contractions  are  noted.  Contrast  passes  freely  into  the  stomach.  The  gastroesophageal  junction  is normal. No  gastroesophageal  reflux  was  demonstrated  during  the  examination.  No hiatal  hernia.

## 2017-09-29 NOTE — PROGRESS NOTE ADULT - SUBJECTIVE AND OBJECTIVE BOX
CONTACT INFO  Derek Wu M.D., PGY-1  Pager: NS- [phone], [facility]- [phone]    Mon-Fri: pager covered by day team 7am-7pm;   ***Academic conferences M-F 8am-9am & 12pm-1pm- page ONLY if URGENT or if Consultant  /Alana: see chart, primary physician assigned available 7am-12pm  Sat/[place] Coverage 12pm-7pm: NS- page [phone] for Team1-4, [facility]- pager forwarded to covering Resident  For Night coverage 7pm-7am: NS- page [phone] Team1-3, page [phone] Team4 & Care Model    BHAKTI HYDE  77y  Female      Patient is a 77y old  Female who presents with a chief complaint of low blood sugar (26 Sep 2017 20:00)      INTERVAL HPI/OVERNIGHT EVENTS: NAEON, patient feels well this morning, is reporting mild lower abdominal pain since yesterday evening and says she has not had BM since wednesday.     REVIEW OF SYSTEMS:  CONSTITUTIONAL: No fever  RESPIRATORY: No cough, wheezing, chills or hemoptysis; No shortness of breath  CARDIOVASCULAR: No chest pain, palpitations, dizziness, or leg swelling  GASTROINTESTINAL: Constipated, mild Lower abdominal pain  GENITOURINARY: No dysuria, frequency, hematuria, or incontinence  NEUROLOGICAL: No headaches  SKIN: No itching, burning, rashes, or lesions   ENDOCRINE: No heat or cold intolerance; No hair loss  PSYCHIATRIC: No difficulty sleeping  HEME/LYMPH: No easy bruising, or bleeding gums    T(C): 37 (09-29-17 @ 05:26), Max: 37.1 (09-28-17 @ 21:22)  HR: 62 (09-29-17 @ 05:26) (62 - 68)  BP: 150/81 (09-29-17 @ 05:26) (124/69 - 150/81)  RR: 18 (09-29-17 @ 05:26) (16 - 18)  SpO2: 100% (09-29-17 @ 05:26) (99% - 100%)  Wt(kg): --Vital Signs Last 24 Hrs  T(C): 37 (29 Sep 2017 05:26), Max: 37.1 (28 Sep 2017 21:22)  T(F): 98.6 (29 Sep 2017 05:26), Max: 98.8 (28 Sep 2017 21:22)  HR: 62 (29 Sep 2017 05:26) (62 - 68)  BP: 150/81 (29 Sep 2017 05:26) (124/69 - 150/81)  BP(mean): --  RR: 18 (29 Sep 2017 05:26) (16 - 18)  SpO2: 100% (29 Sep 2017 05:26) (99% - 100%)  Wt(kg): --    PHYSICAL EXAM:  GENERAL: NAD  HEAD:  Atraumatic, Normocephalic  ENMT: Moist mucous membranes  NERVOUS SYSTEM:  Alert & Oriented x 3, conversant, follows commands, moves all extremities  CHEST/LUNG: Clear to auscultation bilaterally; No rales, rhonchi, wheezing, or rubs  HEART: Regular rate and rhythm; No murmurs, rubs, or gallops  ABDOMEN: Soft, mildly tender in lower abdomen, no rebound, no guarding. +BS  EXTREMITIES:  No edema  SKIN: No rashes or lesions    Consultant(s) Notes Reviewed:  [x ] YES  [ ] NO  Care Discussed with Consultants/Other Providers [ x] YES  [ ] NO    LABS:                        11.0   3.87  )-----------( 159      ( 28 Sep 2017 07:07 )             33.7     09-28    138  |  102  |  11  ----------------------------<  147<H>  3.7   |  26  |  0.99    Ca    8.2<L>      28 Sep 2017 07:07  Phos  2.2     09-28  Mg     1.7     09-28          CAPILLARY BLOOD GLUCOSE  155 (28 Sep 2017 21:22)  181 (28 Sep 2017 17:14)  201 (28 Sep 2017 12:16)  137 (28 Sep 2017 08:36)

## 2017-09-29 NOTE — DISCHARGE NOTE ADULT - PATIENT PORTAL LINK FT
“You can access the [employer] Patient Portal, offered by [facility], by registering with the following website: [email or web address]”

## 2017-09-29 NOTE — PROGRESS NOTE ADULT - PROBLEM SELECTOR PLAN 1
BG at goal  C/W Lantus and Humalog as ordered  Please see DC plan above and Please send all medications to [facility] now to determine coverage. Daughter wants to  all medications and DM supplies today.  Daughter reports she knows how to inject insulin but staff RN will validate her ability.

## 2017-09-29 NOTE — PROGRESS NOTE ADULT - PROBLEM SELECTOR PLAN 3
Positive UA with lower abdominal pain symptoms. Urine culture showed multiple organisms.   - Completed 3 days of ceftriaxone; off abx currently

## 2017-10-01 LAB
BACTERIA BLD CULT: SIGNIFICANT CHANGE UP
BACTERIA BLD CULT: SIGNIFICANT CHANGE UP

## 2020-12-17 NOTE — DISCHARGE NOTE ADULT - REASON FOR REFUSAL
Last CPX: 11/12/20  BP Readings from Last 2 Encounters:   11/12/20 110/70   11/24/19 102/70   Script wrote 11/12/2020 #180 with 3 refills Follow up appointment: 1/29/2021      Name from pharmacy: CLONIDINE HCL 0.1 MG TABLET         Will file in chart as: cloNIDine (CATAPRES) 0.1 MG tablet    Sig: Take 0.5 tablets by mouth 2 times daily. For sweats / hot flashes    Original sig: TAKE 0.5 TABLETS BY MOUTH 2 TIMES DAILY FOR SWEATS / HOT FLASHES    Disp:  90 tablet    Refills:  Not specified    Start: 12/17/2020    Class: Eprescribe    Non-formulary For: Itching, Over weight, Irritable bowel syndrome with constipation, Menopausal syndrome (hot flashes)    Last ordered: 1 month ago by Monica Rebollar MD Last refill: 9/23/2020    Rx #: [id number]       To be filled at: [id number] [place] [facility] - [place], [place] - [address] [address] [address]       
does not know if she wants to take it
